# Patient Record
Sex: FEMALE | Race: WHITE | NOT HISPANIC OR LATINO
[De-identification: names, ages, dates, MRNs, and addresses within clinical notes are randomized per-mention and may not be internally consistent; named-entity substitution may affect disease eponyms.]

---

## 2017-12-10 ENCOUNTER — RESULT REVIEW (OUTPATIENT)
Age: 72
End: 2017-12-10

## 2017-12-11 ENCOUNTER — APPOINTMENT (OUTPATIENT)
Dept: DERMATOLOGY | Facility: CLINIC | Age: 72
End: 2017-12-11
Payer: COMMERCIAL

## 2017-12-11 PROCEDURE — 17003 DESTRUCT PREMALG LES 2-14: CPT

## 2017-12-11 PROCEDURE — 99203 OFFICE O/P NEW LOW 30 MIN: CPT | Mod: 25

## 2017-12-11 PROCEDURE — 17000 DESTRUCT PREMALG LESION: CPT

## 2018-01-17 ENCOUNTER — APPOINTMENT (OUTPATIENT)
Dept: MAMMOGRAPHY | Facility: CLINIC | Age: 73
End: 2018-01-17
Payer: COMMERCIAL

## 2018-01-17 ENCOUNTER — APPOINTMENT (OUTPATIENT)
Dept: RADIOLOGY | Facility: CLINIC | Age: 73
End: 2018-01-17
Payer: COMMERCIAL

## 2018-01-17 ENCOUNTER — APPOINTMENT (OUTPATIENT)
Dept: ULTRASOUND IMAGING | Facility: CLINIC | Age: 73
End: 2018-01-17
Payer: COMMERCIAL

## 2018-01-17 ENCOUNTER — OUTPATIENT (OUTPATIENT)
Dept: OUTPATIENT SERVICES | Facility: HOSPITAL | Age: 73
LOS: 1 days | End: 2018-01-17
Payer: COMMERCIAL

## 2018-01-17 DIAGNOSIS — Z98.1 ARTHRODESIS STATUS: Chronic | ICD-10-CM

## 2018-01-17 DIAGNOSIS — Z00.00 ENCOUNTER FOR GENERAL ADULT MEDICAL EXAMINATION WITHOUT ABNORMAL FINDINGS: ICD-10-CM

## 2018-01-17 PROCEDURE — 77063 BREAST TOMOSYNTHESIS BI: CPT

## 2018-01-17 PROCEDURE — 77063 BREAST TOMOSYNTHESIS BI: CPT | Mod: 26

## 2018-01-17 PROCEDURE — 76641 ULTRASOUND BREAST COMPLETE: CPT | Mod: 26,50

## 2018-01-17 PROCEDURE — 77067 SCR MAMMO BI INCL CAD: CPT | Mod: 26

## 2018-01-17 PROCEDURE — 77080 DXA BONE DENSITY AXIAL: CPT | Mod: 26

## 2018-01-17 PROCEDURE — 77080 DXA BONE DENSITY AXIAL: CPT

## 2018-01-17 PROCEDURE — 76641 ULTRASOUND BREAST COMPLETE: CPT

## 2018-01-17 PROCEDURE — 77067 SCR MAMMO BI INCL CAD: CPT

## 2018-12-20 ENCOUNTER — INPATIENT (INPATIENT)
Facility: HOSPITAL | Age: 73
LOS: 3 days | Discharge: STILL ADMITTED OR ADMINISTRATIVE | DRG: 871 | End: 2018-12-24
Attending: HOSPITALIST | Admitting: INTERNAL MEDICINE
Payer: MEDICARE

## 2018-12-20 VITALS
RESPIRATION RATE: 18 BRPM | OXYGEN SATURATION: 96 % | HEIGHT: 65 IN | HEART RATE: 113 BPM | SYSTOLIC BLOOD PRESSURE: 130 MMHG | TEMPERATURE: 99 F | DIASTOLIC BLOOD PRESSURE: 77 MMHG | WEIGHT: 138.01 LBS

## 2018-12-20 DIAGNOSIS — R11.0 NAUSEA: ICD-10-CM

## 2018-12-20 DIAGNOSIS — A09 INFECTIOUS GASTROENTERITIS AND COLITIS, UNSPECIFIED: ICD-10-CM

## 2018-12-20 DIAGNOSIS — K52.9 NONINFECTIVE GASTROENTERITIS AND COLITIS, UNSPECIFIED: ICD-10-CM

## 2018-12-20 DIAGNOSIS — E78.5 HYPERLIPIDEMIA, UNSPECIFIED: ICD-10-CM

## 2018-12-20 DIAGNOSIS — G89.28 OTHER CHRONIC POSTPROCEDURAL PAIN: ICD-10-CM

## 2018-12-20 DIAGNOSIS — I10 ESSENTIAL (PRIMARY) HYPERTENSION: ICD-10-CM

## 2018-12-20 DIAGNOSIS — Z98.1 ARTHRODESIS STATUS: Chronic | ICD-10-CM

## 2018-12-20 LAB
ALBUMIN SERPL ELPH-MCNC: 4.4 G/DL — SIGNIFICANT CHANGE UP (ref 3.3–5.2)
ALP SERPL-CCNC: 82 U/L — SIGNIFICANT CHANGE UP (ref 40–120)
ALT FLD-CCNC: 14 U/L — SIGNIFICANT CHANGE UP
ANION GAP SERPL CALC-SCNC: 16 MMOL/L — SIGNIFICANT CHANGE UP (ref 5–17)
APPEARANCE UR: CLEAR — SIGNIFICANT CHANGE UP
APTT BLD: 26.8 SEC — LOW (ref 27.5–36.3)
AST SERPL-CCNC: 19 U/L — SIGNIFICANT CHANGE UP
BASOPHILS # BLD AUTO: 0 K/UL — SIGNIFICANT CHANGE UP (ref 0–0.2)
BILIRUB SERPL-MCNC: 0.5 MG/DL — SIGNIFICANT CHANGE UP (ref 0.4–2)
BILIRUB UR-MCNC: NEGATIVE — SIGNIFICANT CHANGE UP
BLD GP AB SCN SERPL QL: SIGNIFICANT CHANGE UP
BUN SERPL-MCNC: 14 MG/DL — SIGNIFICANT CHANGE UP (ref 8–20)
CALCIUM SERPL-MCNC: 9.7 MG/DL — SIGNIFICANT CHANGE UP (ref 8.6–10.2)
CHLORIDE SERPL-SCNC: 100 MMOL/L — SIGNIFICANT CHANGE UP (ref 98–107)
CO2 SERPL-SCNC: 24 MMOL/L — SIGNIFICANT CHANGE UP (ref 22–29)
COLOR SPEC: YELLOW — SIGNIFICANT CHANGE UP
CREAT SERPL-MCNC: 0.74 MG/DL — SIGNIFICANT CHANGE UP (ref 0.5–1.3)
DIFF PNL FLD: ABNORMAL
EOSINOPHIL # BLD AUTO: 0 K/UL — SIGNIFICANT CHANGE UP (ref 0–0.5)
GLUCOSE SERPL-MCNC: 143 MG/DL — HIGH (ref 70–115)
GLUCOSE UR QL: NEGATIVE MG/DL — SIGNIFICANT CHANGE UP
HCT VFR BLD CALC: 41.6 % — SIGNIFICANT CHANGE UP (ref 37–47)
HGB BLD-MCNC: 13.8 G/DL — SIGNIFICANT CHANGE UP (ref 12–16)
INR BLD: 1 RATIO — SIGNIFICANT CHANGE UP (ref 0.88–1.16)
KETONES UR-MCNC: NEGATIVE — SIGNIFICANT CHANGE UP
LACTATE BLDV-MCNC: 1.1 MMOL/L — SIGNIFICANT CHANGE UP (ref 0.5–2)
LACTATE BLDV-MCNC: 3.2 MMOL/L — HIGH (ref 0.5–2)
LEUKOCYTE ESTERASE UR-ACNC: NEGATIVE — SIGNIFICANT CHANGE UP
LYMPHOCYTES # BLD AUTO: 0.5 K/UL — LOW (ref 1–4.8)
LYMPHOCYTES # BLD AUTO: 3 % — LOW (ref 20–55)
MCHC RBC-ENTMCNC: 30.5 PG — SIGNIFICANT CHANGE UP (ref 27–31)
MCHC RBC-ENTMCNC: 33.2 G/DL — SIGNIFICANT CHANGE UP (ref 32–36)
MCV RBC AUTO: 91.8 FL — SIGNIFICANT CHANGE UP (ref 81–99)
MONOCYTES # BLD AUTO: 1.3 K/UL — HIGH (ref 0–0.8)
MONOCYTES NFR BLD AUTO: 8 % — SIGNIFICANT CHANGE UP (ref 3–10)
NEUTROPHILS # BLD AUTO: 14.4 K/UL — HIGH (ref 1.8–8)
NEUTROPHILS NFR BLD AUTO: 86 % — HIGH (ref 37–73)
NITRITE UR-MCNC: NEGATIVE — SIGNIFICANT CHANGE UP
OB PNL STL: POSITIVE
PH UR: 6.5 — SIGNIFICANT CHANGE UP (ref 5–8)
PLATELET # BLD AUTO: 252 K/UL — SIGNIFICANT CHANGE UP (ref 150–400)
POTASSIUM SERPL-MCNC: 3.9 MMOL/L — SIGNIFICANT CHANGE UP (ref 3.5–5.3)
POTASSIUM SERPL-SCNC: 3.9 MMOL/L — SIGNIFICANT CHANGE UP (ref 3.5–5.3)
PROT SERPL-MCNC: 7.5 G/DL — SIGNIFICANT CHANGE UP (ref 6.6–8.7)
PROT UR-MCNC: 15 MG/DL
PROTHROM AB SERPL-ACNC: 11.5 SEC — SIGNIFICANT CHANGE UP (ref 10–12.9)
RBC # BLD: 4.53 M/UL — SIGNIFICANT CHANGE UP (ref 4.4–5.2)
RBC # FLD: 12.5 % — SIGNIFICANT CHANGE UP (ref 11–15.6)
SODIUM SERPL-SCNC: 140 MMOL/L — SIGNIFICANT CHANGE UP (ref 135–145)
SP GR SPEC: 1 — LOW (ref 1.01–1.02)
UROBILINOGEN FLD QL: NEGATIVE MG/DL — SIGNIFICANT CHANGE UP
WBC # BLD: 16.3 K/UL — HIGH (ref 4.8–10.8)
WBC # FLD AUTO: 16.3 K/UL — HIGH (ref 4.8–10.8)

## 2018-12-20 PROCEDURE — 99291 CRITICAL CARE FIRST HOUR: CPT

## 2018-12-20 PROCEDURE — 93010 ELECTROCARDIOGRAM REPORT: CPT

## 2018-12-20 PROCEDURE — 74174 CTA ABD&PLVS W/CONTRAST: CPT | Mod: 26

## 2018-12-20 PROCEDURE — 99223 1ST HOSP IP/OBS HIGH 75: CPT

## 2018-12-20 RX ORDER — ONDANSETRON 8 MG/1
4 TABLET, FILM COATED ORAL ONCE
Qty: 0 | Refills: 0 | Status: COMPLETED | OUTPATIENT
Start: 2018-12-20 | End: 2018-12-20

## 2018-12-20 RX ORDER — SODIUM CHLORIDE 9 MG/ML
1000 INJECTION INTRAMUSCULAR; INTRAVENOUS; SUBCUTANEOUS
Qty: 0 | Refills: 0 | Status: COMPLETED | OUTPATIENT
Start: 2018-12-20 | End: 2018-12-20

## 2018-12-20 RX ORDER — ATORVASTATIN CALCIUM 80 MG/1
10 TABLET, FILM COATED ORAL AT BEDTIME
Qty: 0 | Refills: 0 | Status: DISCONTINUED | OUTPATIENT
Start: 2018-12-20 | End: 2018-12-24

## 2018-12-20 RX ORDER — ACETAMINOPHEN 500 MG
650 TABLET ORAL EVERY 6 HOURS
Qty: 0 | Refills: 0 | Status: DISCONTINUED | OUTPATIENT
Start: 2018-12-20 | End: 2018-12-20

## 2018-12-20 RX ORDER — SODIUM CHLORIDE 9 MG/ML
1878 INJECTION INTRAMUSCULAR; INTRAVENOUS; SUBCUTANEOUS ONCE
Qty: 0 | Refills: 0 | Status: COMPLETED | OUTPATIENT
Start: 2018-12-20 | End: 2018-12-20

## 2018-12-20 RX ORDER — ERTAPENEM SODIUM 1 G/1
500 INJECTION, POWDER, LYOPHILIZED, FOR SOLUTION INTRAMUSCULAR; INTRAVENOUS ONCE
Qty: 0 | Refills: 0 | Status: COMPLETED | OUTPATIENT
Start: 2018-12-20 | End: 2018-12-20

## 2018-12-20 RX ORDER — ACETAMINOPHEN 500 MG
650 TABLET ORAL EVERY 6 HOURS
Qty: 0 | Refills: 0 | Status: DISCONTINUED | OUTPATIENT
Start: 2018-12-20 | End: 2018-12-24

## 2018-12-20 RX ORDER — CIPROFLOXACIN LACTATE 400MG/40ML
400 VIAL (ML) INTRAVENOUS EVERY 12 HOURS
Qty: 0 | Refills: 0 | Status: DISCONTINUED | OUTPATIENT
Start: 2018-12-20 | End: 2018-12-24

## 2018-12-20 RX ORDER — ATENOLOL 25 MG/1
25 TABLET ORAL DAILY
Qty: 0 | Refills: 0 | Status: DISCONTINUED | OUTPATIENT
Start: 2018-12-20 | End: 2018-12-24

## 2018-12-20 RX ORDER — ACETAMINOPHEN 500 MG
650 TABLET ORAL ONCE
Qty: 0 | Refills: 0 | Status: COMPLETED | OUTPATIENT
Start: 2018-12-20 | End: 2018-12-20

## 2018-12-20 RX ORDER — ONDANSETRON 8 MG/1
4 TABLET, FILM COATED ORAL EVERY 6 HOURS
Qty: 0 | Refills: 0 | Status: DISCONTINUED | OUTPATIENT
Start: 2018-12-20 | End: 2018-12-21

## 2018-12-20 RX ORDER — MORPHINE SULFATE 50 MG/1
4 CAPSULE, EXTENDED RELEASE ORAL ONCE
Qty: 0 | Refills: 0 | Status: DISCONTINUED | OUTPATIENT
Start: 2018-12-20 | End: 2018-12-20

## 2018-12-20 RX ORDER — METRONIDAZOLE 500 MG
500 TABLET ORAL EVERY 8 HOURS
Qty: 0 | Refills: 0 | Status: DISCONTINUED | OUTPATIENT
Start: 2018-12-20 | End: 2018-12-24

## 2018-12-20 RX ORDER — CYCLOBENZAPRINE HYDROCHLORIDE 10 MG/1
10 TABLET, FILM COATED ORAL AT BEDTIME
Qty: 0 | Refills: 0 | Status: DISCONTINUED | OUTPATIENT
Start: 2018-12-20 | End: 2018-12-24

## 2018-12-20 RX ORDER — MORPHINE SULFATE 50 MG/1
4 CAPSULE, EXTENDED RELEASE ORAL EVERY 6 HOURS
Qty: 0 | Refills: 0 | Status: DISCONTINUED | OUTPATIENT
Start: 2018-12-20 | End: 2018-12-24

## 2018-12-20 RX ORDER — MORPHINE SULFATE 50 MG/1
15 CAPSULE, EXTENDED RELEASE ORAL EVERY 4 HOURS
Qty: 0 | Refills: 0 | Status: DISCONTINUED | OUTPATIENT
Start: 2018-12-20 | End: 2018-12-24

## 2018-12-20 RX ORDER — PANTOPRAZOLE SODIUM 20 MG/1
40 TABLET, DELAYED RELEASE ORAL
Qty: 0 | Refills: 0 | Status: DISCONTINUED | OUTPATIENT
Start: 2018-12-20 | End: 2018-12-24

## 2018-12-20 RX ADMIN — SODIUM CHLORIDE 1878 MILLILITER(S): 9 INJECTION INTRAMUSCULAR; INTRAVENOUS; SUBCUTANEOUS at 12:34

## 2018-12-20 RX ADMIN — ONDANSETRON 4 MILLIGRAM(S): 8 TABLET, FILM COATED ORAL at 23:38

## 2018-12-20 RX ADMIN — Medication 200 MILLIGRAM(S): at 16:49

## 2018-12-20 RX ADMIN — MORPHINE SULFATE 15 MILLIGRAM(S): 50 CAPSULE, EXTENDED RELEASE ORAL at 17:27

## 2018-12-20 RX ADMIN — MORPHINE SULFATE 4 MILLIGRAM(S): 50 CAPSULE, EXTENDED RELEASE ORAL at 14:30

## 2018-12-20 RX ADMIN — Medication 650 MILLIGRAM(S): at 12:34

## 2018-12-20 RX ADMIN — ERTAPENEM SODIUM 100 MILLIGRAM(S): 1 INJECTION, POWDER, LYOPHILIZED, FOR SOLUTION INTRAMUSCULAR; INTRAVENOUS at 12:33

## 2018-12-20 RX ADMIN — MORPHINE SULFATE 4 MILLIGRAM(S): 50 CAPSULE, EXTENDED RELEASE ORAL at 12:33

## 2018-12-20 RX ADMIN — MORPHINE SULFATE 15 MILLIGRAM(S): 50 CAPSULE, EXTENDED RELEASE ORAL at 18:01

## 2018-12-20 RX ADMIN — ERTAPENEM SODIUM 500 MILLIGRAM(S): 1 INJECTION, POWDER, LYOPHILIZED, FOR SOLUTION INTRAMUSCULAR; INTRAVENOUS at 13:00

## 2018-12-20 RX ADMIN — Medication 1 MILLIGRAM(S): at 12:33

## 2018-12-20 RX ADMIN — ATORVASTATIN CALCIUM 10 MILLIGRAM(S): 80 TABLET, FILM COATED ORAL at 23:38

## 2018-12-20 RX ADMIN — MORPHINE SULFATE 4 MILLIGRAM(S): 50 CAPSULE, EXTENDED RELEASE ORAL at 23:39

## 2018-12-20 RX ADMIN — SODIUM CHLORIDE 1878 MILLILITER(S): 9 INJECTION INTRAMUSCULAR; INTRAVENOUS; SUBCUTANEOUS at 14:45

## 2018-12-20 RX ADMIN — Medication 100 MILLIGRAM(S): at 23:39

## 2018-12-20 RX ADMIN — SODIUM CHLORIDE 75 MILLILITER(S): 9 INJECTION INTRAMUSCULAR; INTRAVENOUS; SUBCUTANEOUS at 16:44

## 2018-12-20 RX ADMIN — Medication 650 MILLIGRAM(S): at 14:30

## 2018-12-20 RX ADMIN — ONDANSETRON 4 MILLIGRAM(S): 8 TABLET, FILM COATED ORAL at 12:35

## 2018-12-20 NOTE — H&P ADULT - HISTORY OF PRESENT ILLNESS
72 yo F w/ hx HTN, HLD, multiple spinal fusion surgery on chronic pain medications presents for acute onset of watery diarrhea with episode of gross bloody stool.    episodes started last night after eating chicken for dinner which patient states "wasn't tasting right".  denies fevers/chills/sweats at home.  associated with abdominal cramps but chronic nausea without vomiting.  no previous episodes, last colonoscopy >10 years ago. no recent EGD.  no other family members ill as didn't eat same meal.

## 2018-12-20 NOTE — ED ADULT NURSE REASSESSMENT NOTE - NS ED NURSE REASSESS COMMENT FT1
Md sim at bedside for admission evalaution
PT received from critical care RN, resting comfortably in B1L. Pt denies any pain at this time and states her "nausea has improved". PT VS WNL and pending CT of abd. Will continue to monitor patient and notifu md of any changes

## 2018-12-20 NOTE — ED ADULT TRIAGE NOTE - ADDITIONAL SAFETY/BANDS...
Patient states she woke up with left arm numbness at 0500. She states it has gotten better but her fingers are still numb.    Additional Safety/Bands:

## 2018-12-20 NOTE — ED PROVIDER NOTE - CRITICAL CARE PROVIDED
documentation/consultation with other physicians/consult w/ pt's family directly relating to pts condition/interpretation of diagnostic studies/45 MINUTES/additional history taking/direct patient care (not related to procedure)

## 2018-12-20 NOTE — ED ADULT TRIAGE NOTE - CHIEF COMPLAINT QUOTE
Pt ambulatory in ED c/o sharp abdominal pains since last night with associated diarrhea and bloody stools. Pt denies anticoagulation. Denies any known fever or chills.

## 2018-12-20 NOTE — ED STATDOCS - PROGRESS NOTE DETAILS
CODE: SEPSIS, called upon rectal temp of 101.3 and HR of 113 74 y/o F pt with hx of chronic back pain, spinal fusions, HTN, and HLD c/o abdominal pain/swelling with associated constipation, nausea, diarrhea since last night. Pt now in ED c/o bloody watery diarrhea starting today. Pt's last BM was yesterday evening; she states her nausea, and constipation are typically normal for her. Her  reports that pt has had more decreased appetite than her usual; but pt states she usually eats in "small amounts". She admits her and her  ate [employer] just last evening and believe the chicken was undercooked, pt's  did not feel any abdominal pain. Pt states her pain was very severe, and she notes tenderness to palpation. Pt has had 2 colonoscopies int eh past, with no issues; pt has one scheduled for her 10 year visit. Pt is currently on Flexeril, and morphine as needed. Pt took Morphine today at 10:00AM. Pt is allergic to Sulfa. Denies vomiting, recent travel, and hx of diverticulitis. No further complaints at this time.  PE: Abd: Tenderness to palpation in suprapubic region, and LLQ. Bowel sounds present in all 4 quadrants. : Bright red blood noted on rectal exam  MDM: Bright red bloody stool, lower abdominal pain, r/o diverticulitis; code sepsis called 73 year old c/o lower abdominal pain and bright red blood per rectum.  PE: Abd: Tenderness to palpation in suprapubic region, and LLQ. Bowel sounds present in all 4 quadrants. Bright red blood noted on rectal exam  MDM: Bright red bloody stool, lower abdominal pain, r/o diverticulitis;  patient with rectal temp code sepsis called and patient sent to Main ER for complete evaluation and workup.

## 2018-12-20 NOTE — ED STATDOCS - MEDICAL DECISION MAKING DETAILS
Pt with bright red bloody stool, lower abdominal pain, r/o diverticulitis; give meds for pain, check labs and lactate, order CT abdomen and re-evaluate. Pt with bright red bloody stool, lower abdominal pain, r/o diverticulitis; give meds for pain, check labs and lactate, order CT abdomen and re-evaluate.  **SEPSIS Critical Care pt, as per CODE: SEPSIS

## 2018-12-20 NOTE — H&P ADULT - ASSESSMENT
72 yo F w/ hx HTN, HLD, multiple spinal fusion surgery on chronic pain medications presents for acute onset of watery diarrhea with episode of gross bloody stool.

## 2018-12-20 NOTE — ED PROVIDER NOTE - MEDICAL DECISION MAKING DETAILS
PT WITH HEMATOCHEZIA AND ABD PAIN AFTER EATING [employer] NUGGETS. CHECK LABS HYDRATE TREAT AS CODE SEPSIS, FU CT

## 2018-12-20 NOTE — ED PROVIDER NOTE - CRITICAL CARE INDICATION, MLM
patient was critically ill... Patient was critically ill with a high probability of imminent or life threatening deterioration. CODE SEPSIS BRPPR AND CRAMPS FEVER

## 2018-12-20 NOTE — ED PROVIDER NOTE - OBJECTIVE STATEMENT
C/C ABDOMINAL PAIN  72 YO FEMALE WITH SUDDEN ONSET OF SEVERE AND SHARP ABDOMINAL PAIN THIS MORNING ASSOCIATED WITH BLOODY STOOL. PT AND  ATE [employer] LAST NIGHT AND STATES THEY BOTH FELT THE FOOD WAS UNDERCOOKED. NO VOMITING. + FEVER NO SIMILAR PRIOR EPISODES  NO HX COLITIS, IBD  HX CHRONIC BACK PAIN ON MEDS FOLLOWED BY PAIN MN[facility]

## 2018-12-21 DIAGNOSIS — K52.9 NONINFECTIVE GASTROENTERITIS AND COLITIS, UNSPECIFIED: ICD-10-CM

## 2018-12-21 LAB
ANION GAP SERPL CALC-SCNC: 11 MMOL/L — SIGNIFICANT CHANGE UP (ref 5–17)
BASOPHILS # BLD AUTO: 0 K/UL — SIGNIFICANT CHANGE UP (ref 0–0.2)
BASOPHILS NFR BLD AUTO: 0.3 % — SIGNIFICANT CHANGE UP (ref 0–2)
BUN SERPL-MCNC: 7 MG/DL — LOW (ref 8–20)
CALCIUM SERPL-MCNC: 7.9 MG/DL — LOW (ref 8.6–10.2)
CHLORIDE SERPL-SCNC: 106 MMOL/L — SIGNIFICANT CHANGE UP (ref 98–107)
CO2 SERPL-SCNC: 24 MMOL/L — SIGNIFICANT CHANGE UP (ref 22–29)
CREAT SERPL-MCNC: 0.56 MG/DL — SIGNIFICANT CHANGE UP (ref 0.5–1.3)
CULTURE RESULTS: NO GROWTH — SIGNIFICANT CHANGE UP
EOSINOPHIL # BLD AUTO: 0.1 K/UL — SIGNIFICANT CHANGE UP (ref 0–0.5)
EOSINOPHIL NFR BLD AUTO: 0.9 % — SIGNIFICANT CHANGE UP (ref 0–6)
GLUCOSE SERPL-MCNC: 101 MG/DL — SIGNIFICANT CHANGE UP (ref 70–115)
HCT VFR BLD CALC: 31.7 % — LOW (ref 37–47)
HGB BLD-MCNC: 10.3 G/DL — LOW (ref 12–16)
LYMPHOCYTES # BLD AUTO: 1 K/UL — SIGNIFICANT CHANGE UP (ref 1–4.8)
LYMPHOCYTES # BLD AUTO: 10.1 % — LOW (ref 20–55)
MAGNESIUM SERPL-MCNC: 1.7 MG/DL — SIGNIFICANT CHANGE UP (ref 1.6–2.6)
MCHC RBC-ENTMCNC: 30.2 PG — SIGNIFICANT CHANGE UP (ref 27–31)
MCHC RBC-ENTMCNC: 32.5 G/DL — SIGNIFICANT CHANGE UP (ref 32–36)
MCV RBC AUTO: 93 FL — SIGNIFICANT CHANGE UP (ref 81–99)
MONOCYTES # BLD AUTO: 1 K/UL — HIGH (ref 0–0.8)
MONOCYTES NFR BLD AUTO: 9.2 % — SIGNIFICANT CHANGE UP (ref 3–10)
NEUTROPHILS # BLD AUTO: 8.3 K/UL — HIGH (ref 1.8–8)
NEUTROPHILS NFR BLD AUTO: 79.3 % — HIGH (ref 37–73)
PLATELET # BLD AUTO: 174 K/UL — SIGNIFICANT CHANGE UP (ref 150–400)
POTASSIUM SERPL-MCNC: 3.2 MMOL/L — LOW (ref 3.5–5.3)
POTASSIUM SERPL-SCNC: 3.2 MMOL/L — LOW (ref 3.5–5.3)
RBC # BLD: 3.41 M/UL — LOW (ref 4.4–5.2)
RBC # FLD: 12.7 % — SIGNIFICANT CHANGE UP (ref 11–15.6)
SODIUM SERPL-SCNC: 141 MMOL/L — SIGNIFICANT CHANGE UP (ref 135–145)
SPECIMEN SOURCE: SIGNIFICANT CHANGE UP
WBC # BLD: 10.4 K/UL — SIGNIFICANT CHANGE UP (ref 4.8–10.8)
WBC # FLD AUTO: 10.4 K/UL — SIGNIFICANT CHANGE UP (ref 4.8–10.8)

## 2018-12-21 PROCEDURE — 99232 SBSQ HOSP IP/OBS MODERATE 35: CPT | Mod: GC

## 2018-12-21 RX ORDER — DEXTROSE MONOHYDRATE, SODIUM CHLORIDE, AND POTASSIUM CHLORIDE 50; .745; 4.5 G/1000ML; G/1000ML; G/1000ML
1000 INJECTION, SOLUTION INTRAVENOUS
Qty: 0 | Refills: 0 | Status: DISCONTINUED | OUTPATIENT
Start: 2018-12-21 | End: 2018-12-22

## 2018-12-21 RX ORDER — SODIUM CHLORIDE 9 MG/ML
1000 INJECTION INTRAMUSCULAR; INTRAVENOUS; SUBCUTANEOUS
Qty: 0 | Refills: 0 | Status: COMPLETED | OUTPATIENT
Start: 2018-12-21 | End: 2018-12-21

## 2018-12-21 RX ORDER — SACCHAROMYCES BOULARDII 250 MG
250 POWDER IN PACKET (EA) ORAL
Qty: 0 | Refills: 0 | Status: DISCONTINUED | OUTPATIENT
Start: 2018-12-21 | End: 2018-12-24

## 2018-12-21 RX ORDER — DEXTROSE MONOHYDRATE, SODIUM CHLORIDE, AND POTASSIUM CHLORIDE 50; .745; 4.5 G/1000ML; G/1000ML; G/1000ML
1000 INJECTION, SOLUTION INTRAVENOUS
Qty: 0 | Refills: 0 | Status: DISCONTINUED | OUTPATIENT
Start: 2018-12-21 | End: 2018-12-21

## 2018-12-21 RX ORDER — POTASSIUM CHLORIDE 20 MEQ
40 PACKET (EA) ORAL ONCE
Qty: 0 | Refills: 0 | Status: COMPLETED | OUTPATIENT
Start: 2018-12-21 | End: 2018-12-21

## 2018-12-21 RX ADMIN — Medication 200 MILLIGRAM(S): at 20:28

## 2018-12-21 RX ADMIN — Medication 200 MILLIGRAM(S): at 06:20

## 2018-12-21 RX ADMIN — Medication 40 MILLIEQUIVALENT(S): at 17:44

## 2018-12-21 RX ADMIN — SODIUM CHLORIDE 75 MILLILITER(S): 9 INJECTION INTRAMUSCULAR; INTRAVENOUS; SUBCUTANEOUS at 06:19

## 2018-12-21 RX ADMIN — Medication 650 MILLIGRAM(S): at 18:08

## 2018-12-21 RX ADMIN — Medication 650 MILLIGRAM(S): at 10:00

## 2018-12-21 RX ADMIN — PANTOPRAZOLE SODIUM 40 MILLIGRAM(S): 20 TABLET, DELAYED RELEASE ORAL at 06:20

## 2018-12-21 RX ADMIN — Medication 650 MILLIGRAM(S): at 09:46

## 2018-12-21 RX ADMIN — Medication 100 MILLIGRAM(S): at 06:19

## 2018-12-21 RX ADMIN — Medication 250 MILLIGRAM(S): at 18:07

## 2018-12-21 RX ADMIN — Medication 200 MILLIGRAM(S): at 09:46

## 2018-12-21 RX ADMIN — MORPHINE SULFATE 4 MILLIGRAM(S): 50 CAPSULE, EXTENDED RELEASE ORAL at 00:05

## 2018-12-21 RX ADMIN — Medication 100 MILLIGRAM(S): at 22:19

## 2018-12-21 RX ADMIN — DEXTROSE MONOHYDRATE, SODIUM CHLORIDE, AND POTASSIUM CHLORIDE 125 MILLILITER(S): 50; .745; 4.5 INJECTION, SOLUTION INTRAVENOUS at 12:20

## 2018-12-21 RX ADMIN — ATORVASTATIN CALCIUM 10 MILLIGRAM(S): 80 TABLET, FILM COATED ORAL at 22:19

## 2018-12-21 RX ADMIN — Medication 100 MILLIGRAM(S): at 17:55

## 2018-12-21 RX ADMIN — Medication 650 MILLIGRAM(S): at 23:59

## 2018-12-21 RX ADMIN — CYCLOBENZAPRINE HYDROCHLORIDE 10 MILLIGRAM(S): 10 TABLET, FILM COATED ORAL at 22:19

## 2018-12-21 RX ADMIN — Medication 650 MILLIGRAM(S): at 17:44

## 2018-12-21 NOTE — CONSULT NOTE ADULT - SUBJECTIVE AND OBJECTIVE BOX
HISTORY OF PRESENT ILLNESS:  This is a 73y old Female with a past medical history significant for HTN, hyperlipidemia presents after having several episodes of diarrhea and abdominal pain. Two night s ag she ate some chicken that tasted funny she began having diarrhea and abdominal pain. Initially it was liquid brown stool every 1/2 hour and then it became bloody. It was associated with left sided abdominal pain. She presented to the ER had a leukocytosis and Ct showed colitis involving the transverse and descending colon. She continues to have abdominal pain and diarrhea although the diarrhea is less frequent. She has had 2 colonoscopies in the past the last was 10 years ago and was unremarkable. No travel history or sick contacts.       REVIEW OF SYSTEMS:  Constitutional:  No unintentional weight loss, fevers, chills or night sweats	  Eyes: No eye pain, redness, discharge, or proptosis  ENMT: No sore throat, ear pain, mouth sores, or swollen glands in the neck  Respiratory: No dyspnea, cough or wheezing  Cardiovascular: No chest pain, dyspnea on exertion, or orthopnea  Gastrointestinal:	Please see HPI  Genitourinary: No dysuria or hematuria  Neurological:	 No changes in sleep/wake cycle, convulsions, confusion, dizziness or lightheadedness  Psychiatric: No changes in personality or emotional problems   Hematology: No easy bruising   Endocrine: No hot or cold flashes or deepening of voice	  All other review of systems were completed and were otherwise negative save what is reported in the HPI.    PAST MEDICAL/SURGICAL HISTORY:  Back pain  Hyperlipidemia  Hypertension  H/O spinal fusion    SOCIAL HISTORY:  - TOBACCO: None  - ALCOHOL: occasional  - ILLICIT DRUG USE: Denies    FAMILY HISTORY:  No known history of gastrointestinal or liver disease;  No pertinent family history in first degree relatives      HOME MEDICATIONS:  aspirin 81 mg oral tablet: 1 tab(s) orally once a day (23 Dec 2016 13:16)  atenolol 25 mg oral tablet: 1 tab(s) orally once a day (23 Dec 2016 13:16)  Lipitor 10 mg oral tablet: 1 tab(s) orally once a day (23 Dec 2016 13:16)  morphine 15 mg oral capsule: 15 milligram(s) orally every 4 hours (23 Dec 2016 13:16)    INPATIENT MEDICATIONS:  MEDICATIONS  (STANDING):  ATENolol  Tablet 25 milliGRAM(s) Oral daily  atorvastatin 10 milliGRAM(s) Oral at bedtime  ciprofloxacin   IVPB 400 milliGRAM(s) IV Intermittent every 12 hours  metroNIDAZOLE  IVPB 500 milliGRAM(s) IV Intermittent every 8 hours  pantoprazole    Tablet 40 milliGRAM(s) Oral before breakfast  potassium chloride   Powder 40 milliEquivalent(s) Oral once  saccharomyces boulardii 250 milliGRAM(s) Oral two times a day  sodium chloride 0.9% with potassium chloride 20 mEq/L 1000 milliLiter(s) (125 mL/Hr) IV Continuous <Continuous>    MEDICATIONS  (PRN):  acetaminophen   Tablet .. 650 milliGRAM(s) Oral every 6 hours PRN Temp greater or equal to 38C (100.4F), Mild Pain (1 - 3)  cyclobenzaprine 10 milliGRAM(s) Oral at bedtime PRN Muscle Spasm  morphine  - Injectable 4 milliGRAM(s) IV Push every 6 hours PRN breakthrough pain  morphine  IR 15 milliGRAM(s) Oral every 4 hours PRN mod to severe pain  trimethobenzamide Injectable 200 milliGRAM(s) IntraMuscular three times a day PRN Nausea and/or Vomiting    ALLERGIES:  sulfa drugs (Unknown)    VITAL SIGNS LAST 24 HOURS:  T(C): 37.6 (21 Dec 2018 08:11), Max: 37.8 (20 Dec 2018 20:55)  T(F): 99.6 (21 Dec 2018 08:11), Max: 100 (20 Dec 2018 20:55)  HR: 82 (21 Dec 2018 08:11) (65 - 101)  BP: 90/51 (21 Dec 2018 08:11) (90/51 - 126/87)  BP(mean): --  RR: 18 (21 Dec 2018 08:11) (14 - 18)  SpO2: 94% (21 Dec 2018 08:11) (94% - 100%)        PHYSICAL EXAM:  Constitutional: Well-developed, well-nourished, in no apparent distress  Eyes: Sclerae anicteric, conjunctivae normal  ENMT: Mucus membranes moist, no oropharyngeal thrush noted  Neck: No thyroid nodules appreciated, no significant cervical or supraclavicular lymphadenopathy  Respiratory: Breathing nonlabored; clear to auscultation  Cardiovascular: Regular rate and rhythm  Gastrointestinal: Soft, nontender, nondistended, normoactive bowel sounds; no hepatosplenomegaly appreciated; no rebound tenderness or involuntary guarding  Rectal: Perianal exam within normal limits; normal sphincter tone, scant blood and brown stool on glove  Extremities: No clubbing, cyanosis or edema  Neurological: Alert and oriented to person, place and time; no asterixis  Skin: No jaundice  Lymph Nodes: No significant lymphadenopathy  Musculoskeletal: No significant peripheral atrophy  Psychiatric: Affect and mood appropriate      LABS:                        10.3   10.4  )-----------( 174      ( 21 Dec 2018 07:51 )             31.7     PT/INR - ( 20 Dec 2018 11:54 )   PT: 11.5 sec;   INR: 1.00 ratio         PTT - ( 20 Dec 2018 11:54 )  PTT:26.8 sec      141  |  106  |  7.0<L>  ----------------------------<  101  3.2<L>   |  24.0  |  0.56    Ca    7.9<L>      21 Dec 2018 07:51  Mg     1.7         TPro  7.5  /  Alb  4.4  /  TBili  0.5  /  DBili  x   /  AST  19  /  ALT  14  /  AlkPhos  82  12-20    LIVER FUNCTIONS - ( 20 Dec 2018 11:54 )  Alb: 4.4 g/dL / Pro: 7.5 g/dL / ALK PHOS: 82 U/L / ALT: 14 U/L / AST: 19 U/L / GGT: x           Urinalysis Basic - ( 20 Dec 2018 14:14 )    Color: Yellow / Appearance: Clear / S.005 / pH: x  Gluc: x / Ketone: Negative  / Bili: Negative / Urobili: Negative mg/dL   Blood: x / Protein: 15 mg/dL / Nitrite: Negative   Leuk Esterase: Negative / RBC: 3-5 /HPF / WBC 0-2   Sq Epi: x / Non Sq Epi: Occasional / Bacteria: x        Culture - Urine (collected 20 Dec 2018 14:13)  Source: .Urine  Final Report (21 Dec 2018 09:30):    No growth        IMAGING:  I personally reviewed the CT , and I agree with the radiologist's interpretation.    < from: CT Angio Abdomen and Pelvis w/ IV Cont (18 @ 13:41) >  IMPRESSION:    Segmental colitis from the distal transverse to the sigmoid without   active gastrointestinal hemorrhage, pneumatosis, or free air.     < end of copied text >

## 2018-12-21 NOTE — PROGRESS NOTE ADULT - SUBJECTIVE AND OBJECTIVE BOX
RUTH CAMPOS    [id number]    73y      Female    CC: GROSS BLOODY STOOLS    INTERVAL HPI/OVERNIGHT EVENTS:  Was seen and examined at bedside. Having 4 episodes of bloody diarrhea since last night. was not able to tolerate ginger ale    HPI:  72 yo F w/ hx HTN, HLD, multiple spinal fusion surgery on chronic pain medications presents for acute onset of watery diarrhea with episode of gross bloody stool.    episodes started last night after eating chicken for dinner which patient states "wasn't tasting right".  denies fevers/chills/sweats at home.  associated with abdominal cramps but chronic nausea without vomiting.  no previous episodes, last colonoscopy >10 years ago. no recent EGD.  no other family members ill as didn't eat same meal.      REVIEW OF SYSTEMS:    CONSTITUTIONAL: No fever, weight loss, or fatigue  RESPIRATORY: No cough, wheezing, hemoptysis; No shortness of breath  CARDIOVASCULAR: No chest pain, palpitations  GASTROINTESTINAL: No abdominal or epigastric pain. No nausea, vomiting  NEUROLOGICAL: No headaches, memory loss, loss of strength.  MISCELLANEOUS:      Vital Signs Last 24 Hrs  T(C): 37.6 (21 Dec 2018 08:11), Max: 37.8 (20 Dec 2018 20:55)  T(F): 99.6 (21 Dec 2018 08:11), Max: 100 (20 Dec 2018 20:55)  HR: 82 (21 Dec 2018 08:11) (65 - 101)  BP: 90/51 (21 Dec 2018 08:11) (90/51 - 126/87)  BP(mean): --  RR: 18 (21 Dec 2018 08:11) (14 - 18)  SpO2: 94% (21 Dec 2018 08:11) (94% - 100%)    PHYSICAL EXAM:    GENERAL: NAD, well-groomed  HEENT: PERRL, +EOMI  NECK: soft, Supple, No JVD,   CHEST/LUNG: Clear to auscultation bilaterally; No wheezing  HEART: S1S2+, Regular rate and rhythm; No murmurs, rubs, or gallops  ABDOMEN: Soft, Nontender, Nondistended; Bowel sounds present  EXTREMITIES:  2+ Peripheral Pulses, No clubbing, cyanosis, or edema  SKIN: No rashes or lesions  NEURO: AAOX3, no focal deficits, no motor r sensory loss  PSYCH: normal mood      LABS:                        10.3   10.4  )-----------( 174      ( 21 Dec 2018 07:51 )             31.7     12-    141  |  106  |  7.0<L>  ----------------------------<  101  3.2<L>   |  24.0  |  0.56    Ca    7.9<L>      21 Dec 2018 07:51  Mg     1.7     -    TPro  7.5  /  Alb  4.4  /  TBili  0.5  /  DBili  x   /  AST  19  /  ALT  14  /  AlkPhos  82  12-    PT/INR - ( 20 Dec 2018 11:54 )   PT: 11.5 sec;   INR: 1.00 ratio         PTT - ( 20 Dec 2018 11:54 )  PTT:26.8 sec  Urinalysis Basic - ( 20 Dec 2018 14:14 )    Color: Yellow / Appearance: Clear / S.005 / pH: x  Gluc: x / Ketone: Negative  / Bili: Negative / Urobili: Negative mg/dL   Blood: x / Protein: 15 mg/dL / Nitrite: Negative   Leuk Esterase: Negative / RBC: 3-5 /HPF / WBC 0-2   Sq Epi: x / Non Sq Epi: Occasional / Bacteria: x          MEDICATIONS  (STANDING):  ATENolol  Tablet 25 milliGRAM(s) Oral daily  atorvastatin 10 milliGRAM(s) Oral at bedtime  ciprofloxacin   IVPB 400 milliGRAM(s) IV Intermittent every 12 hours  metroNIDAZOLE  IVPB 500 milliGRAM(s) IV Intermittent every 8 hours  pantoprazole    Tablet 40 milliGRAM(s) Oral before breakfast  potassium chloride   Powder 40 milliEquivalent(s) Oral once  saccharomyces boulardii 250 milliGRAM(s) Oral two times a day  sodium chloride 0.9% with potassium chloride 20 mEq/L 1000 milliLiter(s) (125 mL/Hr) IV Continuous <Continuous>    MEDICATIONS  (PRN):  acetaminophen   Tablet .. 650 milliGRAM(s) Oral every 6 hours PRN Temp greater or equal to 38C (100.4F), Mild Pain (1 - 3)  cyclobenzaprine 10 milliGRAM(s) Oral at bedtime PRN Muscle Spasm  morphine  - Injectable 4 milliGRAM(s) IV Push every 6 hours PRN breakthrough pain  morphine  IR 15 milliGRAM(s) Oral every 4 hours PRN mod to severe pain  trimethobenzamide Injectable 200 milliGRAM(s) IntraMuscular three times a day PRN Nausea and/or Vomiting      RADIOLOGY & ADDITIONAL TESTS:

## 2018-12-21 NOTE — CONSULT NOTE ADULT - ASSESSMENT
73y old Female with a past medical history significant for HTN, hyperlipidemia presents after having several episodes of diarrhea and abdominal pain. CT c/w colitis involving the distal transverse colon and descending colon. WBC 16 on presentation.   Likely infectious colitis there may be a component of ischemic colitis at this point given the watershed distribution of the colitis. She seems to be improving.     - agree with abx   - cont IVF  - clear liquid diet   - colonoscopy as an outpt in 4-6 weeks         Thanks

## 2018-12-21 NOTE — PROGRESS NOTE ADULT - ASSESSMENT
Assessment and Plan:    72 yo F w/ hx HTN, HLD, multiple spinal fusion surgery on chronic pain medications presents for acute onset of watery diarrhea with episode of gross bloody stool.  GI consulted     Problem/Plan - 1:  ·  Problem: Infectious colitis.  Plan: sepsis criteria met in ER  f/u cultures, GI PCR  IVF,  IV abx  advance diet as tolerated.      Problem/Plan - 2:  ·  Problem: Chronic pain following surgery or procedure.  Plan: restart home pain meds.      Problem/Plan - 3:  ·  Problem: Essential hypertension.  Plan: atenolol  hold aspirin.      Problem/Plan - 4:  ·  Problem: Hyperlipidemia, unspecified hyperlipidemia type.  Plan: lipitor.      Problem/Plan - 5:  ·  Problem: Chronic nausea.  Plan: zofran prn  advised outpatient GI f/u for possible EGD.

## 2018-12-22 PROCEDURE — 99232 SBSQ HOSP IP/OBS MODERATE 35: CPT

## 2018-12-22 RX ORDER — POTASSIUM CHLORIDE 20 MEQ
40 PACKET (EA) ORAL ONCE
Qty: 0 | Refills: 0 | Status: COMPLETED | OUTPATIENT
Start: 2018-12-22 | End: 2018-12-22

## 2018-12-22 RX ORDER — POTASSIUM CHLORIDE 20 MEQ
40 PACKET (EA) ORAL EVERY 4 HOURS
Qty: 0 | Refills: 0 | Status: COMPLETED | OUTPATIENT
Start: 2018-12-22 | End: 2018-12-22

## 2018-12-22 RX ADMIN — Medication 650 MILLIGRAM(S): at 00:54

## 2018-12-22 RX ADMIN — Medication 100 MILLIGRAM(S): at 05:36

## 2018-12-22 RX ADMIN — MORPHINE SULFATE 15 MILLIGRAM(S): 50 CAPSULE, EXTENDED RELEASE ORAL at 20:39

## 2018-12-22 RX ADMIN — Medication 650 MILLIGRAM(S): at 10:44

## 2018-12-22 RX ADMIN — PANTOPRAZOLE SODIUM 40 MILLIGRAM(S): 20 TABLET, DELAYED RELEASE ORAL at 05:36

## 2018-12-22 RX ADMIN — ATORVASTATIN CALCIUM 10 MILLIGRAM(S): 80 TABLET, FILM COATED ORAL at 21:56

## 2018-12-22 RX ADMIN — Medication 200 MILLIGRAM(S): at 20:44

## 2018-12-22 RX ADMIN — Medication 250 MILLIGRAM(S): at 05:36

## 2018-12-22 RX ADMIN — CYCLOBENZAPRINE HYDROCHLORIDE 10 MILLIGRAM(S): 10 TABLET, FILM COATED ORAL at 22:02

## 2018-12-22 RX ADMIN — MORPHINE SULFATE 15 MILLIGRAM(S): 50 CAPSULE, EXTENDED RELEASE ORAL at 21:57

## 2018-12-22 RX ADMIN — Medication 100 MILLIGRAM(S): at 14:45

## 2018-12-22 RX ADMIN — Medication 200 MILLIGRAM(S): at 08:49

## 2018-12-22 RX ADMIN — Medication 650 MILLIGRAM(S): at 11:44

## 2018-12-22 RX ADMIN — Medication 200 MILLIGRAM(S): at 20:38

## 2018-12-22 RX ADMIN — Medication 100 MILLIGRAM(S): at 21:56

## 2018-12-22 RX ADMIN — Medication 40 MILLIEQUIVALENT(S): at 14:07

## 2018-12-22 RX ADMIN — Medication 250 MILLIGRAM(S): at 18:09

## 2018-12-22 RX ADMIN — Medication 40 MILLIEQUIVALENT(S): at 17:19

## 2018-12-22 NOTE — PROGRESS NOTE ADULT - SUBJECTIVE AND OBJECTIVE BOX
RUTH CAMPOS  ----------------------------------------  The patient was seen and evaluated for colitis.  The patient is in no acute distress.  Denied any chest pain, palpitations, or dyspnea.  Reported improvement in the abdominal pain and minimal bleeding with bowel movement.    Vital Signs Last 24 Hrs  T(C): 37.2 (22 Dec 2018 00:22), Max: 37.2 (22 Dec 2018 00:22)  T(F): 98.9 (22 Dec 2018 00:22), Max: 98.9 (22 Dec 2018 00:22)  HR: 69 (22 Dec 2018 04:22) (69 - 82)  BP: 99/60 (22 Dec 2018 04:22) (99/60 - 114/70)  BP(mean): --  RR: 18 (22 Dec 2018 00:22) (18 - 18)  SpO2: 96% (21 Dec 2018 15:49) (96% - 96%)    PHYSICAL EXAMINATION:  ----------------------------------------  General appearance: No acute distress, Awake, Alert  HEENT: Normocephalic, Atraumatic, Conjunctiva clear, EOMI  Neck: Supple, No JVD, No tenderness  Lungs: Clear to auscultation, Breath sound equal bilaterally, No wheezes, No rales  Cardiovascular: S1S2, Regular rhythm  Abdomen: Soft, Nontender, Nondistended, No guarding/rebound, Positive bowel sounds  Extremities: No clubbing, No cyanosis, No edema, No calf tenderness  Neuro: Strength equal bilaterally, No tremors  Psychiatric: Appropriate mood, Normal affect    LABORATORY STUDIES:  ----------------------------------------             10.3   10.4  )-----------( 174      ( 21 Dec 2018 07:51 )             31.7     12-21    141  |  106  |  7.0<L>  ----------------------------<  101  3.2<L>   |  24.0  |  0.56    Ca    7.9<L>      21 Dec 2018 07:51  Mg     1.7     -    TPro  7.5  /  Alb  4.4  /  TBili  0.5  /  DBili  x   /  AST  19  /  ALT  14  /  AlkPhos  82  12    LIVER FUNCTIONS - ( 20 Dec 2018 11:54 )  Alb: 4.4 g/dL / Pro: 7.5 g/dL / ALK PHOS: 82 U/L / ALT: 14 U/L / AST: 19 U/L / GGT: x           PT/INR - ( 20 Dec 2018 11:54 )   PT: 11.5 sec;   INR: 1.00 ratio    PTT - ( 20 Dec 2018 11:54 )  PTT:26.8 sec    Urinalysis Basic - ( 20 Dec 2018 14:14 )  Color: Yellow / Appearance: Clear / S.005 / pH: x  Gluc: x / Ketone: Negative  / Bili: Negative / Urobili: Negative mg/dL   Blood: x / Protein: 15 mg/dL / Nitrite: Negative   Leuk Esterase: Negative / RBC: 3-5 /HPF / WBC 0-2   Sq Epi: x / Non Sq Epi: Occasional / Bacteria: x    Culture - Urine (collected 20 Dec 2018 14:13)  Source: .Urine  Final Report (21 Dec 2018 09:30):    No growth    MEDICATIONS  (STANDING):  ATENolol  Tablet 25 milliGRAM(s) Oral daily  atorvastatin 10 milliGRAM(s) Oral at bedtime  ciprofloxacin   IVPB 400 milliGRAM(s) IV Intermittent every 12 hours  metroNIDAZOLE  IVPB 500 milliGRAM(s) IV Intermittent every 8 hours  pantoprazole    Tablet 40 milliGRAM(s) Oral before breakfast  potassium chloride    Tablet ER 40 milliEquivalent(s) Oral every 4 hours  saccharomyces boulardii 250 milliGRAM(s) Oral two times a day    MEDICATIONS  (PRN):  acetaminophen   Tablet .. 650 milliGRAM(s) Oral every 6 hours PRN Temp greater or equal to 38C (100.4F), Mild Pain (1 - 3)  cyclobenzaprine 10 milliGRAM(s) Oral at bedtime PRN Muscle Spasm  morphine  - Injectable 4 milliGRAM(s) IV Push every 6 hours PRN breakthrough pain  morphine  IR 15 milliGRAM(s) Oral every 4 hours PRN mod to severe pain  trimethobenzamide Injectable 200 milliGRAM(s) IntraMuscular three times a day PRN Nausea and/or Vomiting      ASSESSMENT / PLAN:  ----------------------------------------  Colitis / Hematochezia - On intravenous antibiotics. Leukocytosis and abdominal pain improved. Bleeding subsiding. Stool study results to be followed. To advance diet to full liquids. Gastroenterology consultation noted.    Anemia - Most likely due to acute blood loss. CBC to be repeated.    Back pain - Analgesic medications as needed.    Hypertension - Close blood pressure monitoring. On atenolol.    Hypokalemia - Potassium supplementation.

## 2018-12-22 NOTE — PROGRESS NOTE ADULT - SUBJECTIVE AND OBJECTIVE BOX
Patient seen and examined.  Chart reviewed.  Notes moderate nausea.  No vomiting.  No fever.  No distention.   Having multiple small diarrheal BM's today, about 6-8 x.  Bleeding has resolved.  Not much appetite.      PAST MEDICAL & SURGICAL HISTORY:  Back pain  Hyperlipidemia  Hypertension  H/O spinal fusion      ROS:  No Heartburn, regurgitation, dysphagia, odynophagia.  No dyspepsia  No abdominal pain.    No Nausea, vomiting.  No Bleeding.  No hematemesis.   No diarrhea.    No hematochesia.  No weight loss, anorexia.  No edema.      MEDICATIONS  (STANDING):  ATENolol  Tablet 25 milliGRAM(s) Oral daily  atorvastatin 10 milliGRAM(s) Oral at bedtime  ciprofloxacin   IVPB 400 milliGRAM(s) IV Intermittent every 12 hours  metroNIDAZOLE  IVPB 500 milliGRAM(s) IV Intermittent every 8 hours  pantoprazole    Tablet 40 milliGRAM(s) Oral before breakfast  saccharomyces boulardii 250 milliGRAM(s) Oral two times a day    MEDICATIONS  (PRN):  acetaminophen   Tablet .. 650 milliGRAM(s) Oral every 6 hours PRN Temp greater or equal to 38C (100.4F), Mild Pain (1 - 3)  cyclobenzaprine 10 milliGRAM(s) Oral at bedtime PRN Muscle Spasm  morphine  - Injectable 4 milliGRAM(s) IV Push every 6 hours PRN breakthrough pain  morphine  IR 15 milliGRAM(s) Oral every 4 hours PRN mod to severe pain  trimethobenzamide Injectable 200 milliGRAM(s) IntraMuscular three times a day PRN Nausea and/or Vomiting      Allergies    sulfa drugs (Unknown)    Intolerances        Vital Signs Last 24 Hrs  T(C): 36.9 (22 Dec 2018 15:50), Max: 37.2 (22 Dec 2018 00:22)  T(F): 98.5 (22 Dec 2018 15:50), Max: 98.9 (22 Dec 2018 00:22)  HR: 92 (22 Dec 2018 15:50) (69 - 92)  BP: 124/68 (22 Dec 2018 15:50) (99/60 - 124/68)  BP(mean): --  RR: 18 (22 Dec 2018 00:22) (18 - 18)  SpO2: --    PHYSICAL EXAM:    GENERAL: NAD, well-groomed, well-developed  HEAD:  Atraumatic, Normocephalic  EYES: EOMI, PERRLA, conjunctiva and sclera clear  ENMT: No tonsillar erythema, exudates, or enlargement; Moist mucous membranes, Good dentition, No lesions  NECK: Supple, No JVD, Normal thyroid  CHEST/LUNG: Clear to percussion bilaterally; No rales, rhonchi, wheezing, or rubs  HEART: Regular rate and rhythm; No murmurs, rubs, or gallops  ABDOMEN: Soft, moderate tenderness in left abdomen. , Nondistended; Bowel sounds present;  No scars.   EXTREMITIES:  2+ Peripheral Pulses, No clubbing, cyanosis, or edema  LYMPH: No lymphadenopathy noted  SKIN: No rashes or lesions      LABS:                        10.3   10.4  )-----------( 174      ( 21 Dec 2018 07:51 )             31.7     12-21    141  |  106  |  7.0<L>  ----------------------------<  101  3.2<L>   |  24.0  |  0.56    Ca    7.9<L>      21 Dec 2018 07:51  Mg     1.7     12-21               RADIOLOGY & ADDITIONAL STUDIES:

## 2018-12-23 LAB
ANION GAP SERPL CALC-SCNC: 12 MMOL/L — SIGNIFICANT CHANGE UP (ref 5–17)
BUN SERPL-MCNC: <3 MG/DL — LOW (ref 8–20)
CALCIUM SERPL-MCNC: 8.5 MG/DL — LOW (ref 8.6–10.2)
CHLORIDE SERPL-SCNC: 109 MMOL/L — HIGH (ref 98–107)
CO2 SERPL-SCNC: 24 MMOL/L — SIGNIFICANT CHANGE UP (ref 22–29)
CREAT SERPL-MCNC: 0.51 MG/DL — SIGNIFICANT CHANGE UP (ref 0.5–1.3)
CULTURE RESULTS: SIGNIFICANT CHANGE UP
GLUCOSE SERPL-MCNC: 87 MG/DL — SIGNIFICANT CHANGE UP (ref 70–115)
HCT VFR BLD CALC: 36.5 % — LOW (ref 37–47)
HGB BLD-MCNC: 12 G/DL — SIGNIFICANT CHANGE UP (ref 12–16)
MCHC RBC-ENTMCNC: 30.2 PG — SIGNIFICANT CHANGE UP (ref 27–31)
MCHC RBC-ENTMCNC: 32.9 G/DL — SIGNIFICANT CHANGE UP (ref 32–36)
MCV RBC AUTO: 91.9 FL — SIGNIFICANT CHANGE UP (ref 81–99)
PLATELET # BLD AUTO: 188 K/UL — SIGNIFICANT CHANGE UP (ref 150–400)
POTASSIUM SERPL-MCNC: 4.2 MMOL/L — SIGNIFICANT CHANGE UP (ref 3.5–5.3)
POTASSIUM SERPL-SCNC: 4.2 MMOL/L — SIGNIFICANT CHANGE UP (ref 3.5–5.3)
RBC # BLD: 3.97 M/UL — LOW (ref 4.4–5.2)
RBC # FLD: 12.2 % — SIGNIFICANT CHANGE UP (ref 11–15.6)
SODIUM SERPL-SCNC: 145 MMOL/L — SIGNIFICANT CHANGE UP (ref 135–145)
SPECIMEN SOURCE: SIGNIFICANT CHANGE UP
WBC # BLD: 9.6 K/UL — SIGNIFICANT CHANGE UP (ref 4.8–10.8)
WBC # FLD AUTO: 9.6 K/UL — SIGNIFICANT CHANGE UP (ref 4.8–10.8)

## 2018-12-23 PROCEDURE — 99232 SBSQ HOSP IP/OBS MODERATE 35: CPT

## 2018-12-23 RX ADMIN — Medication 100 MILLIGRAM(S): at 13:19

## 2018-12-23 RX ADMIN — ATORVASTATIN CALCIUM 10 MILLIGRAM(S): 80 TABLET, FILM COATED ORAL at 21:30

## 2018-12-23 RX ADMIN — MORPHINE SULFATE 15 MILLIGRAM(S): 50 CAPSULE, EXTENDED RELEASE ORAL at 03:50

## 2018-12-23 RX ADMIN — Medication 650 MILLIGRAM(S): at 21:30

## 2018-12-23 RX ADMIN — ATENOLOL 25 MILLIGRAM(S): 25 TABLET ORAL at 06:06

## 2018-12-23 RX ADMIN — CYCLOBENZAPRINE HYDROCHLORIDE 10 MILLIGRAM(S): 10 TABLET, FILM COATED ORAL at 21:30

## 2018-12-23 RX ADMIN — Medication 250 MILLIGRAM(S): at 06:06

## 2018-12-23 RX ADMIN — Medication 650 MILLIGRAM(S): at 01:29

## 2018-12-23 RX ADMIN — Medication 100 MILLIGRAM(S): at 21:30

## 2018-12-23 RX ADMIN — Medication 100 MILLIGRAM(S): at 06:06

## 2018-12-23 RX ADMIN — PANTOPRAZOLE SODIUM 40 MILLIGRAM(S): 20 TABLET, DELAYED RELEASE ORAL at 06:06

## 2018-12-23 RX ADMIN — Medication 650 MILLIGRAM(S): at 22:30

## 2018-12-23 RX ADMIN — Medication 200 MILLIGRAM(S): at 20:03

## 2018-12-23 RX ADMIN — Medication 200 MILLIGRAM(S): at 08:30

## 2018-12-23 RX ADMIN — MORPHINE SULFATE 15 MILLIGRAM(S): 50 CAPSULE, EXTENDED RELEASE ORAL at 04:50

## 2018-12-23 RX ADMIN — Medication 250 MILLIGRAM(S): at 17:43

## 2018-12-23 NOTE — PROGRESS NOTE ADULT - SUBJECTIVE AND OBJECTIVE BOX
RUTH CAMPOS  ----------------------------------------  The patient was seen and evaluated for colitis.  The patient is in no acute distress.  Denied any chest pain, palpitations, dyspnea, or abdominal pain.  Reported an episode of watery, non-bloody diarrhea last night but none this morning.    Vital Signs Last 24 Hrs  T(C): 37.1 (23 Dec 2018 07:57), Max: 37.2 (22 Dec 2018 10:49)  T(F): 98.7 (23 Dec 2018 07:57), Max: 98.9 (22 Dec 2018 10:49)  HR: 59 (23 Dec 2018 07:57) (59 - 92)  BP: 102/58 (23 Dec 2018 07:57) (102/58 - 133/73)  BP(mean): --  RR: 18 (23 Dec 2018 07:57) (18 - 18)  SpO2: 96% (23 Dec 2018 07:57) (96% - 96%)    PHYSICAL EXAMINATION:  ----------------------------------------  General appearance: No acute distress, Awake, Alert  HEENT: Normocephalic, Atraumatic, Conjunctiva clear, EOMI  Neck: Supple, No JVD, No tenderness  Lungs: Clear to auscultation, Breath sound equal bilaterally, No wheezes, No rales  Cardiovascular: S1S2, Regular rhythm  Abdomen: Soft, Nontender, Nondistended, No guarding/rebound, Positive bowel sounds  Extremities: No clubbing, No cyanosis, No edema, No calf tenderness  Neuro: Strength equal bilaterally, No tremors  Psychiatric: Appropriate mood, Normal affect    LABORATORY STUDIES:  ----------------------------------------             12.0   9.6   )-----------( 188      ( 23 Dec 2018 07:20 )             36.5     12-23    145  |  109<H>  |  <3.0<L>  ----------------------------<  87  4.2   |  24.0  |  0.51    Ca    8.5<L>      23 Dec 2018 07:20    Culture - Urine (collected 20 Dec 2018 14:13)  Source: .Urine  Final Report (21 Dec 2018 09:30):    No growth    Culture - Blood (collected 20 Dec 2018 11:52)  Source: .Blood  Preliminary Report (22 Dec 2018 13:02):    No growth at 48 hours    Culture - Blood (collected 20 Dec 2018 11:52)  Source: .Blood  Preliminary Report (22 Dec 2018 13:02):    No growth at 48 hours    MEDICATIONS  (STANDING):  ATENolol  Tablet 25 milliGRAM(s) Oral daily  atorvastatin 10 milliGRAM(s) Oral at bedtime  ciprofloxacin   IVPB 400 milliGRAM(s) IV Intermittent every 12 hours  metroNIDAZOLE  IVPB 500 milliGRAM(s) IV Intermittent every 8 hours  pantoprazole    Tablet 40 milliGRAM(s) Oral before breakfast  saccharomyces boulardii 250 milliGRAM(s) Oral two times a day    MEDICATIONS  (PRN):  acetaminophen   Tablet .. 650 milliGRAM(s) Oral every 6 hours PRN Temp greater or equal to 38C (100.4F), Mild Pain (1 - 3)  cyclobenzaprine 10 milliGRAM(s) Oral at bedtime PRN Muscle Spasm  morphine  - Injectable 4 milliGRAM(s) IV Push every 6 hours PRN breakthrough pain  morphine  IR 15 milliGRAM(s) Oral every 4 hours PRN mod to severe pain  trimethobenzamide Injectable 200 milliGRAM(s) IntraMuscular three times a day PRN Nausea and/or Vomiting      ASSESSMENT / PLAN:  ----------------------------------------  Colitis / Hematochezia - On intravenous antibiotics. Fever and leukocytosis resolved. No further bleeding. Stool study results to be followed. The patient  had an episode of diarrhea yesterday night but tolerated breakfast today with good appetite. To advance diet as tolerated. On ciprofloxacin and metronidazole. Stool study results pending.    Acute blood loss anemia - Improved on repeat CBC. No further bleeding noted.    Back pain - Analgesic medications as needed.    Hypertension - Close blood pressure monitoring. On atenolol.    Hypokalemia - Potassium supplemented with improvement. RUTH CAMPOS  ----------------------------------------  The patient was seen and evaluated for colitis.  The patient is in no acute distress.  Denied any chest pain, palpitations, dyspnea, or abdominal pain.  Reported an episode of watery, non-bloody diarrhea last night but none this morning.    HPI:  73F presented with diarrhea and rectal bleeding. CT angiogram of the abdomen noted no active gastrointestinal hemorrhage but did note segmental colitis from the distal transverse to the sigmoid colon. r. Intravenous antibiotics were initiated for colitis and the patient was seen by Gastroenterology in consultation. Repeat laboratory studies noted anemia but the bleeding had subsided. The patient had improvement in the diarrhea and abdominal pain and the diet was advanced.    Vital Signs Last 24 Hrs  T(C): 37.1 (23 Dec 2018 07:57), Max: 37.2 (22 Dec 2018 10:49)  T(F): 98.7 (23 Dec 2018 07:57), Max: 98.9 (22 Dec 2018 10:49)  HR: 59 (23 Dec 2018 07:57) (59 - 92)  BP: 102/58 (23 Dec 2018 07:57) (102/58 - 133/73)  BP(mean): --  RR: 18 (23 Dec 2018 07:57) (18 - 18)  SpO2: 96% (23 Dec 2018 07:57) (96% - 96%)    PHYSICAL EXAMINATION:  ----------------------------------------  General appearance: No acute distress, Awake, Alert  HEENT: Normocephalic, Atraumatic, Conjunctiva clear, EOMI  Neck: Supple, No JVD, No tenderness  Lungs: Clear to auscultation, Breath sound equal bilaterally, No wheezes, No rales  Cardiovascular: S1S2, Regular rhythm  Abdomen: Soft, Nontender, Nondistended, No guarding/rebound, Positive bowel sounds  Extremities: No clubbing, No cyanosis, No edema, No calf tenderness  Neuro: Strength equal bilaterally, No tremors  Psychiatric: Appropriate mood, Normal affect    LABORATORY STUDIES:  ----------------------------------------             12.0   9.6   )-----------( 188      ( 23 Dec 2018 07:20 )             36.5     12-23    145  |  109<H>  |  <3.0<L>  ----------------------------<  87  4.2   |  24.0  |  0.51    Ca    8.5<L>      23 Dec 2018 07:20    Culture - Urine (collected 20 Dec 2018 14:13)  Source: .Urine  Final Report (21 Dec 2018 09:30):    No growth    Culture - Blood (collected 20 Dec 2018 11:52)  Source: .Blood  Preliminary Report (22 Dec 2018 13:02):    No growth at 48 hours    Culture - Blood (collected 20 Dec 2018 11:52)  Source: .Blood  Preliminary Report (22 Dec 2018 13:02):    No growth at 48 hours    MEDICATIONS  (STANDING):  ATENolol  Tablet 25 milliGRAM(s) Oral daily  atorvastatin 10 milliGRAM(s) Oral at bedtime  ciprofloxacin   IVPB 400 milliGRAM(s) IV Intermittent every 12 hours  metroNIDAZOLE  IVPB 500 milliGRAM(s) IV Intermittent every 8 hours  pantoprazole    Tablet 40 milliGRAM(s) Oral before breakfast  saccharomyces boulardii 250 milliGRAM(s) Oral two times a day    MEDICATIONS  (PRN):  acetaminophen   Tablet .. 650 milliGRAM(s) Oral every 6 hours PRN Temp greater or equal to 38C (100.4F), Mild Pain (1 - 3)  cyclobenzaprine 10 milliGRAM(s) Oral at bedtime PRN Muscle Spasm  morphine  - Injectable 4 milliGRAM(s) IV Push every 6 hours PRN breakthrough pain  morphine  IR 15 milliGRAM(s) Oral every 4 hours PRN mod to severe pain  trimethobenzamide Injectable 200 milliGRAM(s) IntraMuscular three times a day PRN Nausea and/or Vomiting      ASSESSMENT / PLAN:  ----------------------------------------  Colitis / Hematochezia - On intravenous antibiotics. Fever and leukocytosis resolved. No further bleeding. Stool study results to be followed. The patient  had an episode of diarrhea yesterday night but tolerated breakfast today with good appetite. To advance diet as tolerated. On ciprofloxacin and metronidazole. Stool study results pending.    Acute blood loss anemia - Improved on repeat CBC. No further bleeding noted.    Back pain - Analgesic medications as needed.    Hypertension - Close blood pressure monitoring. On atenolol.    Hypokalemia - Potassium supplemented with improvement.

## 2018-12-23 NOTE — PROGRESS NOTE ADULT - SUBJECTIVE AND OBJECTIVE BOX
Patient seen and examined; chart reviewed.  No fever;  Diet actually advanced to DASH and tolerated.  Pt had a large watery BM yesterday without bleeding and subsequently felt much better.  Nausea and tenderness are nearly gone.  Appetite has returned.  Antibiotics unchanged.  C/F.  Had a small semi-formed BM today.    Last colonoscopy was 10 yrs ago.      PAST MEDICAL & SURGICAL HISTORY:  Back pain  Hyperlipidemia  Hypertension  H/O spinal fusion      ROS:  No Heartburn, regurgitation, dysphagia, odynophagia.  No dyspepsia  No abdominal pain.    No Nausea, vomiting.  No Bleeding.  No hematemesis.   No diarrhea.    No hematochesia.  No weight loss, anorexia.  No edema.      MEDICATIONS  (STANDING):  ATENolol  Tablet 25 milliGRAM(s) Oral daily  atorvastatin 10 milliGRAM(s) Oral at bedtime  ciprofloxacin   IVPB 400 milliGRAM(s) IV Intermittent every 12 hours  metroNIDAZOLE  IVPB 500 milliGRAM(s) IV Intermittent every 8 hours  pantoprazole    Tablet 40 milliGRAM(s) Oral before breakfast  saccharomyces boulardii 250 milliGRAM(s) Oral two times a day    MEDICATIONS  (PRN):  acetaminophen   Tablet .. 650 milliGRAM(s) Oral every 6 hours PRN Temp greater or equal to 38C (100.4F), Mild Pain (1 - 3)  cyclobenzaprine 10 milliGRAM(s) Oral at bedtime PRN Muscle Spasm  morphine  - Injectable 4 milliGRAM(s) IV Push every 6 hours PRN breakthrough pain  morphine  IR 15 milliGRAM(s) Oral every 4 hours PRN mod to severe pain  trimethobenzamide Injectable 200 milliGRAM(s) IntraMuscular three times a day PRN Nausea and/or Vomiting    Allergies  sulfa drugs (Unknown)    Vital Signs Last 24 Hrs  T(C): 36.9 (23 Dec 2018 15:48), Max: 37.1 (22 Dec 2018 23:59)  T(F): 98.4 (23 Dec 2018 15:48), Max: 98.7 (22 Dec 2018 23:59)  HR: 61 (23 Dec 2018 15:48) (59 - 87)  BP: 113/67 (23 Dec 2018 15:48) (102/58 - 133/73)  BP(mean): --  RR: 18 (23 Dec 2018 07:57) (18 - 18)  SpO2: 96% (23 Dec 2018 07:57) (96% - 96%)    PHYSICAL EXAM:    GENERAL: NAD, well-groomed, well-developed  HEAD:  Atraumatic, Normocephalic  EYES: EOMI, PERRLA, conjunctiva and sclera clear  ENMT: No tonsillar erythema, exudates, or enlargement; Moist mucous membranes, Good dentition, No lesions  NECK: Supple, No JVD, Normal thyroid  CHEST/LUNG: Clear to percussion bilaterally; No rales, rhonchi, wheezing, or rubs  HEART: Regular rate and rhythm; No murmurs, rubs, or gallops  ABDOMEN: Soft, Nontender, Nondistended; Bowel sounds present  EXTREMITIES:  2+ Peripheral Pulses, No clubbing, cyanosis, or edema  LYMPH: No lymphadenopathy noted  SKIN: No rashes or lesions      LABS:                        12.0   9.6   )-----------( 188      ( 23 Dec 2018 07:20 )             36.5     12-23    145  |  109<H>  |  <3.0<L>  ----------------------------<  87  4.2   |  24.0  |  0.51    Ca    8.5<L>      23 Dec 2018 07:20               RADIOLOGY & ADDITIONAL STUDIES:

## 2018-12-23 NOTE — PROGRESS NOTE ADULT - ASSESSMENT
Acute ischemic colitis markedly improved in the last 18 hrs.  Diet changed to DASH/ Low fiber (Residue).    If continues to tolerate diet and remain pain free, then would anticipate discharge in AM.    Eventual colonoscopy to be arranged via GI office follow up.   Low residue diet for another 2 weeks.

## 2018-12-24 ENCOUNTER — TRANSCRIPTION ENCOUNTER (OUTPATIENT)
Age: 73
End: 2018-12-24

## 2018-12-24 VITALS
SYSTOLIC BLOOD PRESSURE: 116 MMHG | OXYGEN SATURATION: 96 % | DIASTOLIC BLOOD PRESSURE: 73 MMHG | HEART RATE: 69 BPM | TEMPERATURE: 99 F | RESPIRATION RATE: 18 BRPM

## 2018-12-24 LAB
CULTURE RESULTS: SIGNIFICANT CHANGE UP
SPECIMEN SOURCE: SIGNIFICANT CHANGE UP

## 2018-12-24 PROCEDURE — 87507 IADNA-DNA/RNA PROBE TQ 12-25: CPT

## 2018-12-24 PROCEDURE — 87086 URINE CULTURE/COLONY COUNT: CPT

## 2018-12-24 PROCEDURE — 99232 SBSQ HOSP IP/OBS MODERATE 35: CPT

## 2018-12-24 PROCEDURE — 80048 BASIC METABOLIC PNL TOTAL CA: CPT

## 2018-12-24 PROCEDURE — 93005 ELECTROCARDIOGRAM TRACING: CPT

## 2018-12-24 PROCEDURE — 85027 COMPLETE CBC AUTOMATED: CPT

## 2018-12-24 PROCEDURE — 85730 THROMBOPLASTIN TIME PARTIAL: CPT

## 2018-12-24 PROCEDURE — 87046 STOOL CULTR AEROBIC BACT EA: CPT

## 2018-12-24 PROCEDURE — 96361 HYDRATE IV INFUSION ADD-ON: CPT

## 2018-12-24 PROCEDURE — 99239 HOSP IP/OBS DSCHRG MGMT >30: CPT

## 2018-12-24 PROCEDURE — 80053 COMPREHEN METABOLIC PANEL: CPT

## 2018-12-24 PROCEDURE — 96375 TX/PRO/DX INJ NEW DRUG ADDON: CPT

## 2018-12-24 PROCEDURE — 87177 OVA AND PARASITES SMEARS: CPT

## 2018-12-24 PROCEDURE — 83735 ASSAY OF MAGNESIUM: CPT

## 2018-12-24 PROCEDURE — 87040 BLOOD CULTURE FOR BACTERIA: CPT

## 2018-12-24 PROCEDURE — 81001 URINALYSIS AUTO W/SCOPE: CPT

## 2018-12-24 PROCEDURE — 99285 EMERGENCY DEPT VISIT HI MDM: CPT | Mod: 25

## 2018-12-24 PROCEDURE — 83605 ASSAY OF LACTIC ACID: CPT

## 2018-12-24 PROCEDURE — 85610 PROTHROMBIN TIME: CPT

## 2018-12-24 PROCEDURE — 87493 C DIFF AMPLIFIED PROBE: CPT

## 2018-12-24 PROCEDURE — 86901 BLOOD TYPING SEROLOGIC RH(D): CPT

## 2018-12-24 PROCEDURE — 87045 FECES CULTURE AEROBIC BACT: CPT

## 2018-12-24 PROCEDURE — 86900 BLOOD TYPING SEROLOGIC ABO: CPT

## 2018-12-24 PROCEDURE — 96374 THER/PROPH/DIAG INJ IV PUSH: CPT | Mod: XU

## 2018-12-24 PROCEDURE — 86850 RBC ANTIBODY SCREEN: CPT

## 2018-12-24 PROCEDURE — 36415 COLL VENOUS BLD VENIPUNCTURE: CPT

## 2018-12-24 PROCEDURE — 74174 CTA ABD&PLVS W/CONTRAST: CPT

## 2018-12-24 PROCEDURE — 82272 OCCULT BLD FECES 1-3 TESTS: CPT

## 2018-12-24 RX ORDER — CIPROFLOXACIN LACTATE 400MG/40ML
1 VIAL (ML) INTRAVENOUS
Qty: 6 | Refills: 0
Start: 2018-12-24 | End: 2018-12-26

## 2018-12-24 RX ORDER — METRONIDAZOLE 500 MG
1 TABLET ORAL
Qty: 9 | Refills: 0
Start: 2018-12-24 | End: 2018-12-26

## 2018-12-24 RX ADMIN — PANTOPRAZOLE SODIUM 40 MILLIGRAM(S): 20 TABLET, DELAYED RELEASE ORAL at 05:40

## 2018-12-24 RX ADMIN — ATENOLOL 25 MILLIGRAM(S): 25 TABLET ORAL at 05:40

## 2018-12-24 RX ADMIN — Medication 100 MILLIGRAM(S): at 05:41

## 2018-12-24 RX ADMIN — MORPHINE SULFATE 15 MILLIGRAM(S): 50 CAPSULE, EXTENDED RELEASE ORAL at 03:28

## 2018-12-24 RX ADMIN — Medication 250 MILLIGRAM(S): at 05:40

## 2018-12-24 NOTE — PROGRESS NOTE ADULT - ASSESSMENT
This is a 73-year-old woman presenting with hematochezia, found to have ischemic colitis, currently tolerating diet, on antibiotics, and diarrhea has resolved.  Patient would very much like to go home, and there are no GI concerns to keeping her hospitalized.  Patient should follow up with her PCP/GI Dr. Brandon Ba within two weeks of discharge.    Thank you for the consult.  GI will sign off.  Please reconsult as needed and call with any questions or concerns.     JOSE E Soliman MD  [facility] Physician [facility]  Division of Gastroenterology  Tel [phone]  Fax [phone]  12-24-18 @ 08:18

## 2018-12-24 NOTE — DISCHARGE NOTE ADULT - MEDICATION SUMMARY - MEDICATIONS TO TAKE
I will START or STAY ON the medications listed below when I get home from the hospital:    Flagyl 500 mg oral tablet  -- 1 tab(s) by mouth 3 times a day   -- Do not drink alcoholic beverages when taking this medication.  Finish all this medication unless otherwise directed by prescriber.  May discolor urine or feces.    -- Indication: For Colitis    morphine 15 mg oral capsule  -- 15 milligram(s) by mouth every 4 hours  -- Indication: For Pain    ondansetron 8 mg oral tablet  -- 1 tab(s) by mouth every 8 hours  --     -- Indication: For Nausea    Lipitor 10 mg oral tablet  -- 1 tab(s) by mouth once a day  -- Indication: For Hyperlipidemia    atenolol 25 mg oral tablet  -- 1 tab(s) by mouth once a day  -- Indication: For HTN    ciprofloxacin 500 mg oral tablet  -- 1 tab(s) by mouth every 12 hours   -- Avoid prolonged or excessive exposure to direct and/or artificial sunlight while taking this medication.  Check with your doctor before becoming pregnant.  Do not take dairy products, antacids, or iron preparations within one hour of this medication.  Finish all this medication unless otherwise directed by prescriber.  Medication should be taken with plenty of water.    -- Indication: For Colitis

## 2018-12-24 NOTE — PROGRESS NOTE ADULT - REASON FOR ADMISSION
hematochezia
hematochezia; Abnormal CT scan
hematochezia; ischemic colitis
hematochezia

## 2018-12-24 NOTE — PROGRESS NOTE ADULT - SUBJECTIVE AND OBJECTIVE BOX
Chief Complaint: This is a 73y old Female patient being seen for follow-up consultation for ischemic colitis.    HPI:  This is a 73-year-old woman being seen in consultation for hematochezia and found to have ischemic colitis.  Patient reports feeling much better today.  Denies any abdominal pain or diarrhea.  Tolerating a diet without issue.  Complains of a vague epigastric pain and accompanying sour taste.      REVIEW OF SYSTEMS: A 14-point review of systems was completed and was otherwise negative save what is reported in the HPI.    PAST MEDICAL/SURGICAL HISTORY:  Back pain  Hyperlipidemia  Hypertension  H/O spinal fusion    MEDICATIONS  (STANDING):  ATENolol  Tablet 25 milliGRAM(s) Oral daily  atorvastatin 10 milliGRAM(s) Oral at bedtime  ciprofloxacin   IVPB 400 milliGRAM(s) IV Intermittent every 12 hours  metroNIDAZOLE  IVPB 500 milliGRAM(s) IV Intermittent every 8 hours  pantoprazole    Tablet 40 milliGRAM(s) Oral before breakfast  saccharomyces boulardii 250 milliGRAM(s) Oral two times a day    MEDICATIONS  (PRN):  acetaminophen   Tablet .. 650 milliGRAM(s) Oral every 6 hours PRN Temp greater or equal to 38C (100.4F), Mild Pain (1 - 3)  cyclobenzaprine 10 milliGRAM(s) Oral at bedtime PRN Muscle Spasm  morphine  - Injectable 4 milliGRAM(s) IV Push every 6 hours PRN breakthrough pain  morphine  IR 15 milliGRAM(s) Oral every 4 hours PRN mod to severe pain  trimethobenzamide Injectable 200 milliGRAM(s) IntraMuscular three times a day PRN Nausea and/or Vomiting    VITAL SIGNS LAST 24 HOURS:  T(C): 37 (24 Dec 2018 07:53), Max: 37 (24 Dec 2018 07:53)  T(F): 98.6 (24 Dec 2018 07:53), Max: 98.6 (24 Dec 2018 07:53)  HR: 69 (24 Dec 2018 07:53) (61 - 69)  BP: 116/73 (24 Dec 2018 07:53) (104/65 - 130/74)  RR: 18 (24 Dec 2018 07:53) (18 - 18)  SpO2: 96% (24 Dec 2018 07:53) (96% - 96%)    12-23-18 @ 07:01  -  12-24-18 @ 07:00  --------------------------------------------------------  IN: 200 mL / OUT: 0 mL / NET: 200 mL      PHYSICAL EXAM:  Constitutional: Well-developed, well-nourished, in no apparent distress  Eyes: Sclerae anicteric, conjunctivae normal  ENMT: Mucus membranes moist, no oropharyngeal thrush noted  Neck: No thyroid nodules appreciated, no significant cervical or supraclavicular lymphadenopathy  Respiratory: Breathing nonlabored; clear to auscultation  Cardiovascular: Regular rate and rhythm  Gastrointestinal: Soft, LLQ tenderness, nondistended, normoactive bowel sounds; no hepatosplenomegaly appreciated; no rebound tenderness or involuntary guarding  Extremities: No clubbing, cyanosis or edema  Neurological: Alert and oriented to person, place and time; no asterixis  Skin: No jaundice  Lymph Nodes: No significant lymphadenopathy  Musculoskeletal: No significant peripheral atrophy  Psychiatric: Affect and mood appropriate                            12.0   9.6   )-----------( 188      ( 23 Dec 2018 07:20 )             36.5     12-23    145  |  109<H>  |  <3.0<L>  ----------------------------<  87  4.2   |  24.0  |  0.51    Ca    8.5<L>      23 Dec 2018 07:20    GI PCR Panel, Stool (collected 23 Dec 2018 00:39)  Source: .Stool  Final Report (23 Dec 2018 12:10):    GI PCR Results: NOT detected    *******Please Note:*******    GI panel PCR evaluates for:    Campylobacter, Plesiomonas shigelloides, Salmonella,    Vibrio, Yersinia enterocolitica, Enteroaggregative    Escherichia coli (EAEC), Enteropathogenic E.coli (EPEC),    Enterotoxigenic E. coli (ETEC) lt/st, Shiga-like    toxin-producing E. coli (STEC) stx1/stx2,    Shigella/ Enteroinvasive E. coli (EIEC), Cryptosporidium,    Cyclospora cayetanensis, Entamoeba histolytica,    Giardia lamblia, Adenovirus F 40/41, Astrovirus,    Norovirus GI/GII, Rotavirus A, Sapovirus    Culture - Stool (collected 22 Dec 2018 07:49)  Source: .Stool  Preliminary Report (23 Dec 2018 11:43):    Culture in progress    IMAGING: I personally reviewed the CTA, and I agree with the radiologist's interpretation.

## 2018-12-24 NOTE — DISCHARGE NOTE ADULT - PLAN OF CARE
. Complete the antibiotics as prescribed. Follow up with your primary care physician for further management. Continue on your home pain medications. Continue on atorvastatin.

## 2018-12-24 NOTE — DISCHARGE NOTE ADULT - CARE PLAN
Principal Discharge DX:	Colitis  Goal:	.  Assessment and plan of treatment:	Complete the antibiotics as prescribed. Follow up with your primary care physician for further management.  Secondary Diagnosis:	Back pain  Assessment and plan of treatment:	Continue on your home pain medications.  Secondary Diagnosis:	Hyperlipidemia  Assessment and plan of treatment:	Continue on atorvastatin.

## 2018-12-24 NOTE — DISCHARGE NOTE ADULT - HOSPITAL COURSE
73F presented with diarrhea and rectal bleeding. On presentation, Temp(101.3), WBC(16.3), H/H(13.8/41.6). CT angiogram of the abdomen noted no active gastrointestinal hemorrhage, noted segmental colitis from the distal transverse to the sigmoid colon, no pneumatosis or free air. Intravenous antibiotics were initiated for colitis and the patient was seen by Gastroenterology in consultation. She was noted to have had prior colonoscopies in the past without significant findings. Repeat laboratory studies noted anemia but the bleeding had subsided. The patient had improvement in the diarrhea and abdominal pain and the diet was advanced. Stool culture and PCR were negative. Repeat laboratory studies noted improvement in the anemia. The patient was discharged home with instructions to follow up with her primary care physician for further management.    35 minutes total time

## 2018-12-24 NOTE — PROGRESS NOTE ADULT - SUBJECTIVE AND OBJECTIVE BOX
RUTH CAMPOS  ----------------------------------------  The patient was seen and evaluated for colitis.  The patient is in no acute distress.  Denied any chest pain, palpitations, dyspnea, or abdominal pain.  Tolerated oral intake.    Vital Signs Last 24 Hrs  T(C): 37 (24 Dec 2018 07:53), Max: 37 (24 Dec 2018 07:53)  T(F): 98.6 (24 Dec 2018 07:53), Max: 98.6 (24 Dec 2018 07:53)  HR: 69 (24 Dec 2018 07:53) (61 - 69)  BP: 116/73 (24 Dec 2018 07:53) (104/65 - 130/74)  BP(mean): --  RR: 18 (24 Dec 2018 07:53) (18 - 18)  SpO2: 96% (24 Dec 2018 07:53) (96% - 96%)    PHYSICAL EXAMINATION:  ----------------------------------------  General appearance: No acute distress, Awake, Alert  HEENT: Normocephalic, Atraumatic, Conjunctiva clear, EOMI  Neck: Supple, No JVD, No tenderness  Lungs: Clear to auscultation, Breath sound equal bilaterally, No wheezes, No rales  Cardiovascular: S1S2, Regular rhythm  Abdomen: Soft, Nontender, Nondistended, No guarding/rebound, Positive bowel sounds  Extremities: No clubbing, No cyanosis, No edema, No calf tenderness  Neuro: Strength equal bilaterally, No tremors  Psychiatric: Appropriate mood, Normal affect    LABORATORY STUDIES:  ----------------------------------------             12.0   9.6   )-----------( 188      ( 23 Dec 2018 07:20 )             36.5     12-23    145  |  109<H>  |  <3.0<L>  ----------------------------<  87  4.2   |  24.0  |  0.51    Ca    8.5<L>      23 Dec 2018 07:20    GI PCR Panel, Stool (collected 23 Dec 2018 00:39)  Source: .Stool  Final Report (23 Dec 2018 12:10):    GI PCR Results: NOT detected    *******Please Note:*******    GI panel PCR evaluates for:    Campylobacter, Plesiomonas shigelloides, Salmonella,    Vibrio, Yersinia enterocolitica, Enteroaggregative    Escherichia coli (EAEC), Enteropathogenic E.coli (EPEC),    Enterotoxigenic E. coli (ETEC) lt/st, Shiga-like    toxin-producing E. coli (STEC) stx1/stx2,    Shigella/ Enteroinvasive E. coli (EIEC), Cryptosporidium,    Cyclospora cayetanensis, Entamoeba histolytica,    Giardia lamblia, Adenovirus F 40/41, Astrovirus,    Norovirus GI/GII, Rotavirus A, Sapovirus    Culture - Stool (collected 22 Dec 2018 07:49)  Source: .Stool  Preliminary Report (23 Dec 2018 11:43):    Culture in progress    MEDICATIONS  (STANDING):  ATENolol  Tablet 25 milliGRAM(s) Oral daily  atorvastatin 10 milliGRAM(s) Oral at bedtime  ciprofloxacin   IVPB 400 milliGRAM(s) IV Intermittent every 12 hours  metroNIDAZOLE  IVPB 500 milliGRAM(s) IV Intermittent every 8 hours  pantoprazole    Tablet 40 milliGRAM(s) Oral before breakfast  saccharomyces boulardii 250 milliGRAM(s) Oral two times a day    MEDICATIONS  (PRN):  acetaminophen   Tablet .. 650 milliGRAM(s) Oral every 6 hours PRN Temp greater or equal to 38C (100.4F), Mild Pain (1 - 3)  cyclobenzaprine 10 milliGRAM(s) Oral at bedtime PRN Muscle Spasm  morphine  - Injectable 4 milliGRAM(s) IV Push every 6 hours PRN breakthrough pain  morphine  IR 15 milliGRAM(s) Oral every 4 hours PRN mod to severe pain  trimethobenzamide Injectable 200 milliGRAM(s) IntraMuscular three times a day PRN Nausea and/or Vomiting      ASSESSMENT / PLAN:  ----------------------------------------  Colitis / Hematochezia - Fever and leukocytosis resolved. No further bleeding. Stool study were negative. Tolerating oral intake.    Acute blood loss anemia - No further bleeding noted. CBC with improved blood count.    Back pain - Analgesic medications as needed.    Hypertension - Close blood pressure monitoring. On atenolol.    Hypokalemia - Potassium supplemented with improvement.

## 2018-12-24 NOTE — DISCHARGE NOTE ADULT - PATIENT PORTAL LINK FT
You can access the [facility] Patient Portal, offered by [facility], by registering with the following website: [email or web address]

## 2018-12-24 NOTE — DISCHARGE NOTE ADULT - CARE PROVIDER_API CALL
Brandon Ba), Gastroenterology; Internal Medicine  [address] [place] [address]  Phone: [phone]  Fax: [phone]

## 2018-12-25 LAB
CULTURE RESULTS: SIGNIFICANT CHANGE UP
CULTURE RESULTS: SIGNIFICANT CHANGE UP
SPECIMEN SOURCE: SIGNIFICANT CHANGE UP
SPECIMEN SOURCE: SIGNIFICANT CHANGE UP

## 2018-12-26 LAB
CULTURE RESULTS: SIGNIFICANT CHANGE UP
SPECIMEN SOURCE: SIGNIFICANT CHANGE UP

## 2019-01-07 ENCOUNTER — APPOINTMENT (OUTPATIENT)
Dept: GASTROENTEROLOGY | Facility: CLINIC | Age: 74
End: 2019-01-07
Payer: MEDICARE

## 2019-01-07 VITALS
SYSTOLIC BLOOD PRESSURE: 141 MMHG | DIASTOLIC BLOOD PRESSURE: 83 MMHG | BODY MASS INDEX: 23.32 KG/M2 | HEART RATE: 84 BPM | HEIGHT: 65 IN | WEIGHT: 140 LBS

## 2019-01-07 DIAGNOSIS — K55.9 VASCULAR DISORDER OF INTESTINE, UNSPECIFIED: ICD-10-CM

## 2019-01-07 PROCEDURE — 99214 OFFICE O/P EST MOD 30 MIN: CPT

## 2019-01-07 RX ORDER — MORPHINE SULFATE 15 MG/1
15 TABLET ORAL
Refills: 0 | Status: ACTIVE | COMMUNITY

## 2019-01-07 RX ORDER — ATORVASTATIN CALCIUM 10 MG/1
10 TABLET, FILM COATED ORAL
Refills: 0 | Status: ACTIVE | COMMUNITY

## 2019-01-07 RX ORDER — CYCLOBENZAPRINE HCL 10 MG
10 TABLET ORAL
Refills: 0 | Status: ACTIVE | COMMUNITY

## 2019-01-07 RX ORDER — ATENOLOL 25 MG/1
25 TABLET ORAL
Refills: 0 | Status: ACTIVE | COMMUNITY

## 2019-01-07 RX ORDER — POLYETHYLENE GLYOCOL 3350, SODIUM CHLORIDE, SODIUM BICARBONATE AND POTASSIUM CHLORIDE 420; 11.2; 5.72; 1.48 G/4L; G/4L; G/4L; G/4L
420 POWDER, FOR SOLUTION NASOGASTRIC; ORAL
Qty: 1 | Refills: 0 | Status: ACTIVE | COMMUNITY
Start: 2019-01-07 | End: 1900-01-01

## 2019-01-07 NOTE — ASSESSMENT
[FreeTextEntry1] : Recent bout of documented ischemic colitis requiring hospitalization for about 5 days later in December, 2018. She did conservatively and bleeding, and symptoms resolved. Concluded antibiotics on 12/27. Doing well on low-residue diet. Advised to advance to high fiber diet\par Call for Any recurrent symptoms. Advised patient to plan to have colonoscopy in about 3 months for evaluation of the colon to exclude chronic colitis or neoplasia. The procedure, its risks, benefits, and prepped, were explained to the patient, who understands and is agreeable to proceed. Try light prep will be utilized. Blood work will be repeated prior to the procedure. ASA #2. Patient is an optimal medical condition to undergo planned procedure. Results to follow. Arranged.\par \par Positive Cologuard test:  Rule out colon neoplasia. Colonoscopy as above.\par \par Persistent nausea and upper abdominal distress x3 months with nonspecific weight loss of about 10 pounds. Rule out upper GI pathology. Upper endoscopy offered to the patient. The procedure, its risks, benefits, and prepped, were explained to the patient, on stent and is agreeable to proceed. #2. We'll arrange. Urea breath test to precede the procedure and treat accordingly.

## 2019-01-07 NOTE — HISTORY OF PRESENT ILLNESS
[FreeTextEntry1] : 73-year-old white female with history of hypertension, and hyperlipidemia, and negative colonoscopy 10 and 20 years ago, who presented to [facility] in 12/20/2018 with left-sided abdominal pain and bloody diarrhea. Vein preceded the passage of diarrhea, then, blood. Hemoglobins remained around 10, no transfusions required. CAT scan showed a long segment of colitis, involving the distal transverse colon, and descending colon, and splenic flexure. Patient was clinically diagnosed with ischemic colitis and supported. IV fluids and IV antibiotics, Cipro and Flagyl were administered. Patient improved gradually and left the hospital was no diarrhea or bleeding. Stool studies were negative. Antibiotics, Cipro and Flagyl were continued for a total of 7 days on  inpatient plus outpatient basis. Antibiotics were concluded about 2 weeks ago. Patient has been observing a low-residue diet and had been having soft bowel movements, 1-2 per day. Currently, he had a normal formed bowel movement today. She has been pain-free. No recurrence of pain or bleeding or diarrhea. Looks and feels well. No new medications. Patient has not restarted her prophylactic aspirin.\par Patient reports that prior to becoming ill, a colo-Guard stool test was performed and was reported to be positive. She was intending to schedule a followup colonoscopy, nonetheless. \par Family history is negative for colorectal neoplasia. Screening colonoscopies have been negative 10 and 20 years ago.\par Patient reports that she has been recently having episodes of nausea about 3 times per week without vomiting. There she has sustained about a 10 pound weight loss over the past 3 months. No epigastric pain. No use of aspirin or NSAIDs. No overt upper GI bleeding. No dysphagia or odynophagia.

## 2019-01-07 NOTE — PHYSICAL EXAM
[General Appearance - Alert] : alert [General Appearance - In No Acute Distress] : in no acute distress [Outer Ear] : the ears and nose were normal in appearance [Oropharynx] : the oropharynx was normal [Neck Appearance] : the appearance of the neck was normal [Neck Cervical Mass (___cm)] : no neck mass was observed [Jugular Venous Distention Increased] : there was no jugular-venous distention [Thyroid Diffuse Enlargement] : the thyroid was not enlarged [Thyroid Nodule] : there were no palpable thyroid nodules [Auscultation Breath Sounds / Voice Sounds] : lungs were clear to auscultation bilaterally [Heart Rate And Rhythm] : heart rate was normal and rhythm regular [Heart Sounds] : normal S1 and S2 [Heart Sounds Gallop] : no gallops [Murmurs] : no murmurs [Heart Sounds Pericardial Friction Rub] : no pericardial rub [Bowel Sounds] : normal bowel sounds [Abdomen Soft] : soft [Abdomen Tenderness] : non-tender [] : no hepato-splenomegaly [Abdomen Mass (___ Cm)] : no abdominal mass palpated [No CVA Tenderness] : no ~M costovertebral angle tenderness [No Spinal Tenderness] : no spinal tenderness [Abnormal Walk] : normal gait [Nail Clubbing] : no clubbing  or cyanosis of the fingernails [Musculoskeletal - Swelling] : no joint swelling seen [Motor Tone] : muscle strength and tone were normal

## 2019-02-25 ENCOUNTER — APPOINTMENT (OUTPATIENT)
Dept: MAMMOGRAPHY | Facility: CLINIC | Age: 74
End: 2019-02-25
Payer: MEDICARE

## 2019-02-25 ENCOUNTER — OUTPATIENT (OUTPATIENT)
Dept: OUTPATIENT SERVICES | Facility: HOSPITAL | Age: 74
LOS: 1 days | End: 2019-02-25
Payer: MEDICARE

## 2019-02-25 DIAGNOSIS — Z12.31 ENCOUNTER FOR SCREENING MAMMOGRAM FOR MALIGNANT NEOPLASM OF BREAST: ICD-10-CM

## 2019-02-25 DIAGNOSIS — Z98.1 ARTHRODESIS STATUS: Chronic | ICD-10-CM

## 2019-02-25 PROCEDURE — 77063 BREAST TOMOSYNTHESIS BI: CPT

## 2019-02-25 PROCEDURE — 77067 SCR MAMMO BI INCL CAD: CPT | Mod: 26

## 2019-02-25 PROCEDURE — 77067 SCR MAMMO BI INCL CAD: CPT

## 2019-02-25 PROCEDURE — 77063 BREAST TOMOSYNTHESIS BI: CPT | Mod: 26

## 2019-03-07 ENCOUNTER — APPOINTMENT (OUTPATIENT)
Dept: FAMILY MEDICINE | Facility: CLINIC | Age: 74
End: 2019-03-07

## 2019-04-24 ENCOUNTER — APPOINTMENT (OUTPATIENT)
Dept: GASTROENTEROLOGY | Facility: GI CENTER | Age: 74
End: 2019-04-24

## 2020-11-04 ENCOUNTER — APPOINTMENT (OUTPATIENT)
Dept: ORTHOPEDIC SURGERY | Facility: CLINIC | Age: 75
End: 2020-11-04
Payer: MEDICARE

## 2020-11-04 VITALS
HEIGHT: 65 IN | SYSTOLIC BLOOD PRESSURE: 173 MMHG | HEART RATE: 85 BPM | BODY MASS INDEX: 22.66 KG/M2 | DIASTOLIC BLOOD PRESSURE: 97 MMHG | WEIGHT: 136 LBS

## 2020-11-04 DIAGNOSIS — F11.20 OPIOID DEPENDENCE, UNCOMPLICATED: ICD-10-CM

## 2020-11-04 DIAGNOSIS — Z87.39 PERSONAL HISTORY OF OTHER DISEASES OF THE MUSCULOSKELETAL SYSTEM AND CONNECTIVE TISSUE: ICD-10-CM

## 2020-11-04 DIAGNOSIS — Z80.9 FAMILY HISTORY OF MALIGNANT NEOPLASM, UNSPECIFIED: ICD-10-CM

## 2020-11-04 DIAGNOSIS — Z86.39 PERSONAL HISTORY OF OTHER ENDOCRINE, NUTRITIONAL AND METABOLIC DISEASE: ICD-10-CM

## 2020-11-04 DIAGNOSIS — Z86.79 PERSONAL HISTORY OF OTHER DISEASES OF THE CIRCULATORY SYSTEM: ICD-10-CM

## 2020-11-04 DIAGNOSIS — M17.11 UNILATERAL PRIMARY OSTEOARTHRITIS, RIGHT KNEE: ICD-10-CM

## 2020-11-04 DIAGNOSIS — Z78.9 OTHER SPECIFIED HEALTH STATUS: ICD-10-CM

## 2020-11-04 PROCEDURE — 99072 ADDL SUPL MATRL&STAF TM PHE: CPT

## 2020-11-04 PROCEDURE — 99204 OFFICE O/P NEW MOD 45 MIN: CPT

## 2020-11-04 RX ORDER — ACETAMINOPHEN 325 MG/1
TABLET, FILM COATED ORAL
Refills: 0 | Status: ACTIVE | COMMUNITY

## 2020-11-04 NOTE — PHYSICAL EXAM
[LE] : Sensory: Intact in bilateral lower extremities [ALL] : dorsalis pedis, posterior tibial, femoral, popliteal, and radial 2+ and symmetric bilaterally [de-identified] : GENERAL APPEARANCE: Well nourished and hydrated, pleasant, alert, and oriented x 3. Appears their stated age. \par HEENT: Normocephalic, extraocular eye motion intact. Nasal septum midline. Oral cavity clear. External auditory canal clear. \par RESPIRATORY: Breath sounds clear and audible in all lobes. No wheezing, No accessory muscle use.\par CARDIOVASCULAR: No apparent abnormalities. No lower leg edema. No varicosities. Pedal pulses are palpable.\par NEUROLOGIC: Sensation is normal, no muscle weakness in the upper or lower extremities.\par DERMATOLOGIC: No apparent skin lesions, moist, warm, no rash.\par SPINE: Cervical spine appears normal and moves freely; thoracic spine appears normal and moves freely; lumbosacral spine appears normal and moves freely, normal, nontender.\par MUSCULOSKELETAL: Hands, wrists, and elbows are normal and move freely, shoulders are normal and move freely.  [de-identified] : Right knee exam shows medial joint line tenderness, varus deformity [de-identified] : 3V xray of the right knee done in the office today and reviewed by Dr. Shon Contreras demonstrates bone on bone medial and patellofemoral osteoarthritis

## 2020-11-04 NOTE — HISTORY OF PRESENT ILLNESS
[Pain Location] : pain [Worsening] : worsening [2] : a current pain level of 2/10 [8] : a maximum pain level of 8/10 [Intermit.] : ~He/She~ states the symptoms seem to be intermittent [Bending] : worsened by bending [Walking] : worsened by walking [Ice] : relieved by ice [Rest] : relieved by rest [de-identified] : 73 y/o F presents with right knee pain. The pain has been present since August 2019. She reports more in medial knee pain and anterior knee. She notes more pain with walking and at the end of the day. In the morning, she does not have significant pain. She has pain in the medial and anterior aspect of the right knee. She feels some catching in her right knee. She is using morphine 15 mg 6 times a day. She tried cortisone injection in the knee by Dr. Funes and it only helped for 2 days. She is s/p back fustion X4 over 2 years. The pt had a right foot drop from 9120-8224.  [Ataxia] : no ataxia [Incontinence] : no incontinence [Loss of Dexterity] : good dexterity [Urinary Ret.] : no urinary retention [de-identified] : stairs

## 2020-11-04 NOTE — DISCUSSION/SUMMARY
Dr. Darwin Vogt to room on arrival with RN and patient [Medication Risks Reviewed] : Medication risks reviewed [de-identified] : 73 y/o F with bone on bone medial and patellofemoral osteoarthritis of the right knee. Conservative therapy and surgical options discussed in detail with the patient. The patient is a candidate for a right TKA. She has failed conservative therapies. We discussed pre-op, surgery, and post-op in detail. The patient takes a total of 90 mg of morphine a day--15 mg 6 times a day. Pt understands the risks associated with taking pain medication prior to surgery. We highly encourage the patient to reduce the amount of pain medication she takes before surgery. She should continue to consult Dr. Coty Wiseman to help reduce the amount of narcotics she takes. Pt will discuss the surgery with Dr. Coty Wiseman and her  and will contact the office when she is ready to pursue surgery. \par \par \par The patient is a 74 year individual with end stage arthritis of their right knee joint. Based upon the patient's continued symptoms and failure to respond to conservative treatment I have recommended a right total knee arthroplasty for this patient. A long discussion took place with the patient describing what a total joint replacement is and what the procedure would entail. A total knee arthroplasty model, similar to the implant that was used during the operation, was utilized to demonstrate and to discuss the various bearing surfaces of the implants. The hospitalization and post-operative care and rehabilitation were also discussed. The use of perioperative antibiotics and DVT prophylaxis were discussed. The risk, benefits and alternatives to a surgical intervention were discussed at length with the patient. The patient was also advised of risks related to the medical comorbidities, elevated body mass index (BMI), and smoking where applicable. We discussed how to reduce modifiable risk factors and encouraged smoking cessation were applicable.. A lengthy discussion took place to review the most common complications including but not limited to: deep vein thrombosis, pulmonary embolus, heart attack, stroke, infection, wound breakdown, numbness, damage to nerves, tendon, muscles, arteries or other blood vessels, death and other possible complications from anesthesia. The patient was told that we will take steps to minimize these risks by using sterile technique, antibiotics and DVT prophylaxis when appropriate and follow the patient postoperatively in the office setting to monitor progress. The possibility of recurrent pain, no improvement in pain and actual worsening of pain were also discussed with the patient.\par The discharge plan of care focused on the patient going home following surgery. The patient was encouraged to make the necessary arrangements to have someone stay with them when they are discharged home. Following discharge, a home care nurse was to the patient. The home care nurse would open the patient’s home care case and request home physical therapy services. Home physical therapy was to commence following discharge provided it was appropriate and covered by the health insurance benefit plan. \par The benefits of surgery were discussed with the patient including the potential for improving her current clinical condition through operative intervention. Alternatives to surgical intervention including continued conservative management were also discussed in detail. All questions were answered to the satisfaction of the patient. The treatment plan of care, as well as a model of a total knee arthroplasty equivalent to the one that will be used for their total joint replacement, was shared with the patient. The patient agreed to the plan of care as well as the use of implants in their total joint replacement.

## 2020-11-04 NOTE — END OF VISIT
[FreeTextEntry3] : I, Carroll Mcgee, acted solely as a scribe for Dr. Shon Contreras on this date 11/04/2020.

## 2020-12-10 ENCOUNTER — APPOINTMENT (OUTPATIENT)
Dept: MAMMOGRAPHY | Facility: CLINIC | Age: 75
End: 2020-12-10

## 2021-05-20 ENCOUNTER — OUTPATIENT (OUTPATIENT)
Dept: OUTPATIENT SERVICES | Facility: HOSPITAL | Age: 76
LOS: 1 days | End: 2021-05-20
Payer: MEDICARE

## 2021-05-20 ENCOUNTER — APPOINTMENT (OUTPATIENT)
Dept: MAMMOGRAPHY | Facility: CLINIC | Age: 76
End: 2021-05-20
Payer: MEDICARE

## 2021-05-20 DIAGNOSIS — Z98.1 ARTHRODESIS STATUS: Chronic | ICD-10-CM

## 2021-05-20 DIAGNOSIS — Z00.00 ENCOUNTER FOR GENERAL ADULT MEDICAL EXAMINATION WITHOUT ABNORMAL FINDINGS: ICD-10-CM

## 2021-05-20 PROCEDURE — 77067 SCR MAMMO BI INCL CAD: CPT | Mod: 26

## 2021-05-20 PROCEDURE — 77063 BREAST TOMOSYNTHESIS BI: CPT

## 2021-05-20 PROCEDURE — 77067 SCR MAMMO BI INCL CAD: CPT

## 2021-05-20 PROCEDURE — 77063 BREAST TOMOSYNTHESIS BI: CPT | Mod: 26

## 2022-12-12 ENCOUNTER — OUTPATIENT (OUTPATIENT)
Dept: OUTPATIENT SERVICES | Facility: HOSPITAL | Age: 77
LOS: 1 days | End: 2022-12-12
Payer: MEDICARE

## 2022-12-12 ENCOUNTER — APPOINTMENT (OUTPATIENT)
Dept: MAMMOGRAPHY | Facility: CLINIC | Age: 77
End: 2022-12-12

## 2022-12-12 DIAGNOSIS — Z00.8 ENCOUNTER FOR OTHER GENERAL EXAMINATION: ICD-10-CM

## 2022-12-12 DIAGNOSIS — Z98.1 ARTHRODESIS STATUS: Chronic | ICD-10-CM

## 2022-12-12 PROCEDURE — 77063 BREAST TOMOSYNTHESIS BI: CPT | Mod: 26

## 2022-12-12 PROCEDURE — 77067 SCR MAMMO BI INCL CAD: CPT

## 2022-12-12 PROCEDURE — 77063 BREAST TOMOSYNTHESIS BI: CPT

## 2022-12-12 PROCEDURE — 77067 SCR MAMMO BI INCL CAD: CPT | Mod: 26

## 2023-09-22 ENCOUNTER — OUTPATIENT (OUTPATIENT)
Dept: OUTPATIENT SERVICES | Facility: HOSPITAL | Age: 78
LOS: 1 days | End: 2023-09-22
Payer: MEDICARE

## 2023-09-22 VITALS
HEIGHT: 63 IN | HEART RATE: 72 BPM | DIASTOLIC BLOOD PRESSURE: 80 MMHG | RESPIRATION RATE: 18 BRPM | SYSTOLIC BLOOD PRESSURE: 167 MMHG | TEMPERATURE: 99 F | WEIGHT: 138.01 LBS | OXYGEN SATURATION: 99 %

## 2023-09-22 DIAGNOSIS — Z87.81 PERSONAL HISTORY OF (HEALED) TRAUMATIC FRACTURE: Chronic | ICD-10-CM

## 2023-09-22 DIAGNOSIS — Z90.89 ACQUIRED ABSENCE OF OTHER ORGANS: Chronic | ICD-10-CM

## 2023-09-22 DIAGNOSIS — Z98.891 HISTORY OF UTERINE SCAR FROM PREVIOUS SURGERY: Chronic | ICD-10-CM

## 2023-09-22 DIAGNOSIS — M17.10 UNILATERAL PRIMARY OSTEOARTHRITIS, UNSPECIFIED KNEE: ICD-10-CM

## 2023-09-22 DIAGNOSIS — Z01.818 ENCOUNTER FOR OTHER PREPROCEDURAL EXAMINATION: ICD-10-CM

## 2023-09-22 DIAGNOSIS — Z29.9 ENCOUNTER FOR PROPHYLACTIC MEASURES, UNSPECIFIED: ICD-10-CM

## 2023-09-22 DIAGNOSIS — M19.90 UNSPECIFIED OSTEOARTHRITIS, UNSPECIFIED SITE: ICD-10-CM

## 2023-09-22 DIAGNOSIS — Z98.1 ARTHRODESIS STATUS: Chronic | ICD-10-CM

## 2023-09-22 DIAGNOSIS — I10 ESSENTIAL (PRIMARY) HYPERTENSION: ICD-10-CM

## 2023-09-22 LAB
A1C WITH ESTIMATED AVERAGE GLUCOSE RESULT: 5.5 % — SIGNIFICANT CHANGE UP (ref 4–5.6)
ANION GAP SERPL CALC-SCNC: 13 MMOL/L — SIGNIFICANT CHANGE UP (ref 5–17)
BLD GP AB SCN SERPL QL: NEGATIVE — SIGNIFICANT CHANGE UP
BUN SERPL-MCNC: 16 MG/DL — SIGNIFICANT CHANGE UP (ref 7–23)
CALCIUM SERPL-MCNC: 10.4 MG/DL — SIGNIFICANT CHANGE UP (ref 8.4–10.5)
CHLORIDE SERPL-SCNC: 88 MMOL/L — LOW (ref 96–108)
CO2 SERPL-SCNC: 30 MMOL/L — SIGNIFICANT CHANGE UP (ref 22–31)
CREAT SERPL-MCNC: 0.65 MG/DL — SIGNIFICANT CHANGE UP (ref 0.5–1.3)
EGFR: 90 ML/MIN/1.73M2 — SIGNIFICANT CHANGE UP
ESTIMATED AVERAGE GLUCOSE: 111 MG/DL — SIGNIFICANT CHANGE UP (ref 68–114)
GLUCOSE SERPL-MCNC: 112 MG/DL — HIGH (ref 70–99)
HCT VFR BLD CALC: 38.1 % — SIGNIFICANT CHANGE UP (ref 34.5–45)
HGB BLD-MCNC: 13.4 G/DL — SIGNIFICANT CHANGE UP (ref 11.5–15.5)
MCHC RBC-ENTMCNC: 31.5 PG — SIGNIFICANT CHANGE UP (ref 27–34)
MCHC RBC-ENTMCNC: 35.2 GM/DL — SIGNIFICANT CHANGE UP (ref 32–36)
MCV RBC AUTO: 89.4 FL — SIGNIFICANT CHANGE UP (ref 80–100)
MRSA PCR RESULT.: SIGNIFICANT CHANGE UP
NRBC # BLD: 0 /100 WBCS — SIGNIFICANT CHANGE UP (ref 0–0)
PLATELET # BLD AUTO: 254 K/UL — SIGNIFICANT CHANGE UP (ref 150–400)
POTASSIUM SERPL-MCNC: 3.1 MMOL/L — LOW (ref 3.5–5.3)
POTASSIUM SERPL-SCNC: 3.1 MMOL/L — LOW (ref 3.5–5.3)
RBC # BLD: 4.26 M/UL — SIGNIFICANT CHANGE UP (ref 3.8–5.2)
RBC # FLD: 11.5 % — SIGNIFICANT CHANGE UP (ref 10.3–14.5)
RH IG SCN BLD-IMP: POSITIVE — SIGNIFICANT CHANGE UP
S AUREUS DNA NOSE QL NAA+PROBE: SIGNIFICANT CHANGE UP
SODIUM SERPL-SCNC: 131 MMOL/L — LOW (ref 135–145)
WBC # BLD: 7.73 K/UL — SIGNIFICANT CHANGE UP (ref 3.8–10.5)
WBC # FLD AUTO: 7.73 K/UL — SIGNIFICANT CHANGE UP (ref 3.8–10.5)

## 2023-09-22 PROCEDURE — G0463: CPT

## 2023-09-22 PROCEDURE — 86901 BLOOD TYPING SEROLOGIC RH(D): CPT

## 2023-09-22 PROCEDURE — 87641 MR-STAPH DNA AMP PROBE: CPT

## 2023-09-22 PROCEDURE — 87640 STAPH A DNA AMP PROBE: CPT

## 2023-09-22 PROCEDURE — 80048 BASIC METABOLIC PNL TOTAL CA: CPT

## 2023-09-22 PROCEDURE — 86850 RBC ANTIBODY SCREEN: CPT

## 2023-09-22 PROCEDURE — 85027 COMPLETE CBC AUTOMATED: CPT

## 2023-09-22 PROCEDURE — 36415 COLL VENOUS BLD VENIPUNCTURE: CPT

## 2023-09-22 PROCEDURE — 83036 HEMOGLOBIN GLYCOSYLATED A1C: CPT

## 2023-09-22 PROCEDURE — 86900 BLOOD TYPING SEROLOGIC ABO: CPT

## 2023-09-22 NOTE — H&P PST ADULT - OTHER CARE PROVIDERS
Dr. Landaverde- [facility] ([facility]), - Pain management- [place]

## 2023-09-22 NOTE — H&P PST ADULT - NSICDXPASTMEDICALHX_GEN_ALL_CORE_FT
PAST MEDICAL HISTORY:  Back pain     [place] (hard of hearing)     Hyperlipidemia     Hypertension

## 2023-09-22 NOTE — H&P PST ADULT - ASSESSMENT
DASI Score: 6.02  DASI Activity: able to go up one flight of stairs with cane. Light housewotrk  Loose or removable teeth: denies    CAPRINI SCORE    AGE RELATED RISK FACTORS                                                       MOBILITY RELATED FACTORS  [ ] Age 41-60 years                                            (1 Point)                  [ ] Bed rest                                                        (1 Point)  [ ] Age: 61-74 years                                           (2 Points)                [ ] Plaster cast                                                   (2 Points)  [x ] Age= 75 years                                              (3 Points)                 [ ] Bed bound for more than 72 hours                   (2 Points)    DISEASE RELATED RISK FACTORS                                               GENDER SPECIFIC FACTORS  [ ] Edema in the lower extremities                       (1 Point)                  [ ] Pregnancy                                                     (1 Point)  [ ] Varicose veins                                               (1 Point)                  [ ] Post-partum < 6 weeks                                   (1 Point)             [ ] BMI > 25 Kg/m2                                            (1 Point)                  [ ] Hormonal therapy  or oral contraception            (1 Point)                 [ ] Sepsis (in the previous month)                        (1 Point)                  [ ] History of pregnancy complications  [ ] Pneumonia or serious lung disease                                               [ ] Unexplained or recurrent                       (1 Point)           (in the previous month)                               (1 Point)  [ ] Abnormal pulmonary function test                     (1 Point)                 SURGERY RELATED RISK FACTORS  [ ] Acute myocardial infarction                              (1 Point)                 [ ]  Section                                            (1 Point)  [ ] Congestive heart failure (in the previous month)  (1 Point)                 [ ] Minor surgery                                                 (1 Point)   [ ] Inflammatory bowel disease                             (1 Point)                 [ ] Arthroscopic surgery                                        (2 Points)  [ ] Central venous access                                    (2 Points)                [ ] General surgery lasting more than 45 minutes   (2 Points)       [ ] Stroke (in the previous month)                          (5 Points)               [x ] Elective arthroplasty                                        (5 Points)                                                                                                                                               HEMATOLOGY RELATED FACTORS                                                 TRAUMA RELATED RISK FACTORS  [ ] Prior episodes of VTE                                     (3 Points)                 [ ] Fracture of the hip, pelvis, or leg                       (5 Points)  [ ] Positive family history for VTE                         (3 Points)                 [ ] Acute spinal cord injury (in the previous month)  (5 Points)  [ ] Prothrombin 28234 A                                      (3 Points)                 [ ] Paralysis  (less than 1 month)                          (5 Points)  [ ] Factor V Leiden                                             (3 Points)                 [ ] Multiple Trauma within 1 month                         (5 Points)  [ ] Lupus anticoagulants                                     (3 Points)                                                           [ ] Anticardiolipin antibodies                                (3 Points)                                                       [ ] High homocysteine in the blood                      (3 Points)                                             [ ] Other congenital or acquired thrombophilia       (3 Points)                                                [ ] Heparin induced thrombocytopenia                  (3 Points)                                          Total Score [   8  ]

## 2023-09-22 NOTE — H&P PST ADULT - HISTORY OF PRESENT ILLNESS
This is 77 year old female with past medical history of HTN, HLD  and prior lumbar spine surgery x 4 follows with pain management monthly. Reports having Right knee pain for the last two years, has tried conservative   measures with pain medications and Gel injections- lastly in May 2023 with associated bilateral lower extremity paresthesia. Wears immobilizer for support during ambulation and uses cane.   Presenting to PST for scheduled Right total knee replacement on 10/6/23 with Dr. De   This is 77 year old female with past medical history of HTN, HLD  and prior lumbar spine surgery x 4, follows with pain management monthly. Reports having Right knee pain for the last two years, has tried conservative measures with pain medications and Gel injections- lastly in May 2023 with associated bilateral lower extremity paresthesia. Wears immobilizer for support during ambulation and uses cane.   Presenting to PST for scheduled Right total knee replacement on 10/6/23 with Dr. De

## 2023-09-22 NOTE — H&P PST ADULT - ATTENDING COMMENTS
77 year old woman with end stage right knee OA here for right tkr.  She understands r/b/a which include infection, blood loss, nv damage, stiffness, dvt.

## 2023-09-22 NOTE — H&P PST ADULT - PROBLEM SELECTOR PLAN 1
Preop Preop instructions and chlorhexidene soap given  Labs CBC BMP A1c T&S MRSA performed in [facility]  ABO DOS

## 2023-09-22 NOTE — H&P PST ADULT - NSICDXPASTSURGICALHX_GEN_ALL_CORE_FT
PAST SURGICAL HISTORY:  H/O spinal fusion     History of  section     History of fracture of left ankle     History of tonsillectomy

## 2023-10-05 ENCOUNTER — TRANSCRIPTION ENCOUNTER (OUTPATIENT)
Age: 78
End: 2023-10-05

## 2023-10-05 PROBLEM — H91.90 UNSPECIFIED HEARING LOSS, UNSPECIFIED EAR: Chronic | Status: ACTIVE | Noted: 2023-09-22

## 2023-10-06 ENCOUNTER — TRANSCRIPTION ENCOUNTER (OUTPATIENT)
Age: 78
End: 2023-10-06

## 2023-10-06 ENCOUNTER — OUTPATIENT (OUTPATIENT)
Dept: INPATIENT UNIT | Facility: HOSPITAL | Age: 78
LOS: 1 days | End: 2023-10-06
Payer: MEDICARE

## 2023-10-06 VITALS
WEIGHT: 138.01 LBS | HEIGHT: 63 IN | SYSTOLIC BLOOD PRESSURE: 158 MMHG | HEART RATE: 71 BPM | RESPIRATION RATE: 17 BRPM | DIASTOLIC BLOOD PRESSURE: 87 MMHG | TEMPERATURE: 99 F | OXYGEN SATURATION: 99 %

## 2023-10-06 DIAGNOSIS — Z87.81 PERSONAL HISTORY OF (HEALED) TRAUMATIC FRACTURE: Chronic | ICD-10-CM

## 2023-10-06 DIAGNOSIS — M17.10 UNILATERAL PRIMARY OSTEOARTHRITIS, UNSPECIFIED KNEE: ICD-10-CM

## 2023-10-06 DIAGNOSIS — Z90.89 ACQUIRED ABSENCE OF OTHER ORGANS: Chronic | ICD-10-CM

## 2023-10-06 DIAGNOSIS — Z98.1 ARTHRODESIS STATUS: Chronic | ICD-10-CM

## 2023-10-06 DIAGNOSIS — Z98.891 HISTORY OF UTERINE SCAR FROM PREVIOUS SURGERY: Chronic | ICD-10-CM

## 2023-10-06 LAB
GLUCOSE BLDC GLUCOMTR-MCNC: 91 MG/DL — SIGNIFICANT CHANGE UP (ref 70–99)
RH IG SCN BLD-IMP: POSITIVE — SIGNIFICANT CHANGE UP

## 2023-10-06 DEVICE — PATELLA  ALL POLY VE 32MM: Type: IMPLANTABLE DEVICE | Site: RIGHT | Status: FUNCTIONAL

## 2023-10-06 DEVICE — SCREW HEX HEADED 3.5X48MM: Type: IMPLANTABLE DEVICE | Site: RIGHT | Status: FUNCTIONAL

## 2023-10-06 DEVICE — ZIMMER FEMALE HEX SCREW MAGNETIC 2.5MM X 25MM: Type: IMPLANTABLE DEVICE | Site: RIGHT | Status: FUNCTIONAL

## 2023-10-06 DEVICE — CEMENT SIMPLEX P 40GM: Type: IMPLANTABLE DEVICE | Site: RIGHT | Status: FUNCTIONAL

## 2023-10-06 DEVICE — TIB PSN NP STM 5 DEG SZ DR: Type: IMPLANTABLE DEVICE | Site: RIGHT | Status: FUNCTIONAL

## 2023-10-06 DEVICE — ZIMMER/NEXGEN SMOOTH PIN 3.2X75MM: Type: IMPLANTABLE DEVICE | Site: RIGHT | Status: FUNCTIONAL

## 2023-10-06 DEVICE — FEM PERSONA PS CMT CCR STD SZ7 R: Type: IMPLANTABLE DEVICE | Site: RIGHT | Status: FUNCTIONAL

## 2023-10-06 DEVICE — SURF ART PERSONA 6-9 CD RT 10MM: Type: IMPLANTABLE DEVICE | Site: RIGHT | Status: FUNCTIONAL

## 2023-10-06 RX ORDER — ACETAMINOPHEN 500 MG
1000 TABLET ORAL ONCE
Refills: 0 | Status: COMPLETED | OUTPATIENT
Start: 2023-10-07 | End: 2023-10-07

## 2023-10-06 RX ORDER — LIDOCAINE 4 G/100G
1 CREAM TOPICAL DAILY
Refills: 0 | Status: DISCONTINUED | OUTPATIENT
Start: 2023-10-06 | End: 2023-10-08

## 2023-10-06 RX ORDER — METOPROLOL TARTRATE 50 MG
50 TABLET ORAL DAILY
Refills: 0 | Status: DISCONTINUED | OUTPATIENT
Start: 2023-10-06 | End: 2023-10-06

## 2023-10-06 RX ORDER — CHLORTHALIDONE 50 MG
1 TABLET ORAL
Refills: 0 | DISCHARGE

## 2023-10-06 RX ORDER — PANTOPRAZOLE SODIUM 20 MG/1
40 TABLET, DELAYED RELEASE ORAL
Refills: 0 | Status: DISCONTINUED | OUTPATIENT
Start: 2023-10-06 | End: 2023-10-08

## 2023-10-06 RX ORDER — SODIUM CHLORIDE 9 MG/ML
3 INJECTION INTRAMUSCULAR; INTRAVENOUS; SUBCUTANEOUS EVERY 8 HOURS
Refills: 0 | Status: DISCONTINUED | OUTPATIENT
Start: 2023-10-06 | End: 2023-10-06

## 2023-10-06 RX ORDER — POLYETHYLENE GLYCOL 3350 17 G/17G
17 POWDER, FOR SOLUTION ORAL AT BEDTIME
Refills: 0 | Status: DISCONTINUED | OUTPATIENT
Start: 2023-10-06 | End: 2023-10-08

## 2023-10-06 RX ORDER — ONDANSETRON 8 MG/1
4 TABLET, FILM COATED ORAL ONCE
Refills: 0 | Status: DISCONTINUED | OUTPATIENT
Start: 2023-10-06 | End: 2023-10-06

## 2023-10-06 RX ORDER — KETOROLAC TROMETHAMINE 30 MG/ML
15 SYRINGE (ML) INJECTION EVERY 6 HOURS
Refills: 0 | Status: DISCONTINUED | OUTPATIENT
Start: 2023-10-06 | End: 2023-10-06

## 2023-10-06 RX ORDER — VALSARTAN 80 MG/1
40 TABLET ORAL DAILY
Refills: 0 | Status: DISCONTINUED | OUTPATIENT
Start: 2023-10-06 | End: 2023-10-08

## 2023-10-06 RX ORDER — OMEGA-3 ACID ETHYL ESTERS 1 G
1 CAPSULE ORAL
Refills: 0 | DISCHARGE

## 2023-10-06 RX ORDER — ONDANSETRON 8 MG/1
4 TABLET, FILM COATED ORAL EVERY 6 HOURS
Refills: 0 | Status: DISCONTINUED | OUTPATIENT
Start: 2023-10-06 | End: 2023-10-07

## 2023-10-06 RX ORDER — TRAMADOL HYDROCHLORIDE 50 MG/1
50 TABLET ORAL ONCE
Refills: 0 | Status: DISCONTINUED | OUTPATIENT
Start: 2023-10-06 | End: 2023-10-06

## 2023-10-06 RX ORDER — LIDOCAINE HCL 20 MG/ML
0.2 VIAL (ML) INJECTION ONCE
Refills: 0 | Status: DISCONTINUED | OUTPATIENT
Start: 2023-10-06 | End: 2023-10-06

## 2023-10-06 RX ORDER — SODIUM CHLORIDE 9 MG/ML
1000 INJECTION, SOLUTION INTRAVENOUS
Refills: 0 | Status: DISCONTINUED | OUTPATIENT
Start: 2023-10-06 | End: 2023-10-06

## 2023-10-06 RX ORDER — CEFAZOLIN SODIUM 1 G
2000 VIAL (EA) INJECTION ONCE
Refills: 0 | Status: COMPLETED | OUTPATIENT
Start: 2023-10-06 | End: 2023-10-06

## 2023-10-06 RX ORDER — PANTOPRAZOLE SODIUM 20 MG/1
40 TABLET, DELAYED RELEASE ORAL ONCE
Refills: 0 | Status: COMPLETED | OUTPATIENT
Start: 2023-10-06 | End: 2023-10-06

## 2023-10-06 RX ORDER — SODIUM CHLORIDE 9 MG/ML
500 INJECTION INTRAMUSCULAR; INTRAVENOUS; SUBCUTANEOUS ONCE
Refills: 0 | Status: COMPLETED | OUTPATIENT
Start: 2023-10-06 | End: 2023-10-06

## 2023-10-06 RX ORDER — ACETAMINOPHEN 500 MG
975 TABLET ORAL EVERY 8 HOURS
Refills: 0 | Status: DISCONTINUED | OUTPATIENT
Start: 2023-10-07 | End: 2023-10-08

## 2023-10-06 RX ORDER — CYCLOBENZAPRINE HYDROCHLORIDE 10 MG/1
10 TABLET, FILM COATED ORAL AT BEDTIME
Refills: 0 | Status: DISCONTINUED | OUTPATIENT
Start: 2023-10-06 | End: 2023-10-08

## 2023-10-06 RX ORDER — CEFAZOLIN SODIUM 1 G
2000 VIAL (EA) INJECTION EVERY 8 HOURS
Refills: 0 | Status: COMPLETED | OUTPATIENT
Start: 2023-10-06 | End: 2023-10-07

## 2023-10-06 RX ORDER — CEFAZOLIN SODIUM 1 G
2000 VIAL (EA) INJECTION EVERY 8 HOURS
Refills: 0 | Status: DISCONTINUED | OUTPATIENT
Start: 2023-10-06 | End: 2023-10-06

## 2023-10-06 RX ORDER — SODIUM CHLORIDE 9 MG/ML
500 INJECTION INTRAMUSCULAR; INTRAVENOUS; SUBCUTANEOUS ONCE
Refills: 0 | Status: COMPLETED | OUTPATIENT
Start: 2023-10-06 | End: 2023-10-07

## 2023-10-06 RX ORDER — DEXAMETHASONE 0.5 MG/5ML
8 ELIXIR ORAL ONCE
Refills: 0 | Status: COMPLETED | OUTPATIENT
Start: 2023-10-07 | End: 2023-10-07

## 2023-10-06 RX ORDER — HYDROMORPHONE HYDROCHLORIDE 2 MG/ML
0.5 INJECTION INTRAMUSCULAR; INTRAVENOUS; SUBCUTANEOUS
Refills: 0 | Status: DISCONTINUED | OUTPATIENT
Start: 2023-10-06 | End: 2023-10-06

## 2023-10-06 RX ORDER — MAGNESIUM HYDROXIDE 400 MG/1
30 TABLET, CHEWABLE ORAL DAILY
Refills: 0 | Status: DISCONTINUED | OUTPATIENT
Start: 2023-10-06 | End: 2023-10-08

## 2023-10-06 RX ORDER — ACETAMINOPHEN 500 MG
1000 TABLET ORAL ONCE
Refills: 0 | Status: COMPLETED | OUTPATIENT
Start: 2023-10-06 | End: 2023-10-06

## 2023-10-06 RX ORDER — HYDROMORPHONE HYDROCHLORIDE 2 MG/ML
0.5 INJECTION INTRAMUSCULAR; INTRAVENOUS; SUBCUTANEOUS
Refills: 0 | Status: DISCONTINUED | OUTPATIENT
Start: 2023-10-06 | End: 2023-10-07

## 2023-10-06 RX ORDER — ACETAMINOPHEN 500 MG
975 TABLET ORAL EVERY 8 HOURS
Refills: 0 | Status: DISCONTINUED | OUTPATIENT
Start: 2023-10-06 | End: 2023-10-06

## 2023-10-06 RX ORDER — ATORVASTATIN CALCIUM 80 MG/1
1 TABLET, FILM COATED ORAL
Refills: 0 | DISCHARGE

## 2023-10-06 RX ORDER — ONDANSETRON 8 MG/1
4 TABLET, FILM COATED ORAL EVERY 6 HOURS
Refills: 0 | Status: DISCONTINUED | OUTPATIENT
Start: 2023-10-06 | End: 2023-10-06

## 2023-10-06 RX ORDER — NALOXONE HYDROCHLORIDE 4 MG/.1ML
0.1 SPRAY NASAL
Refills: 0 | Status: DISCONTINUED | OUTPATIENT
Start: 2023-10-06 | End: 2023-10-07

## 2023-10-06 RX ORDER — METOPROLOL TARTRATE 50 MG
50 TABLET ORAL DAILY
Refills: 0 | Status: DISCONTINUED | OUTPATIENT
Start: 2023-10-06 | End: 2023-10-08

## 2023-10-06 RX ORDER — SODIUM CHLORIDE 9 MG/ML
1000 INJECTION INTRAMUSCULAR; INTRAVENOUS; SUBCUTANEOUS
Refills: 0 | Status: DISCONTINUED | OUTPATIENT
Start: 2023-10-06 | End: 2023-10-07

## 2023-10-06 RX ORDER — HYDROMORPHONE HYDROCHLORIDE 2 MG/ML
30 INJECTION INTRAMUSCULAR; INTRAVENOUS; SUBCUTANEOUS
Refills: 0 | Status: DISCONTINUED | OUTPATIENT
Start: 2023-10-06 | End: 2023-10-07

## 2023-10-06 RX ORDER — ATORVASTATIN CALCIUM 80 MG/1
10 TABLET, FILM COATED ORAL AT BEDTIME
Refills: 0 | Status: DISCONTINUED | OUTPATIENT
Start: 2023-10-06 | End: 2023-10-08

## 2023-10-06 RX ORDER — ASPIRIN/CALCIUM CARB/MAGNESIUM 324 MG
81 TABLET ORAL
Refills: 0 | Status: DISCONTINUED | OUTPATIENT
Start: 2023-10-06 | End: 2023-10-08

## 2023-10-06 RX ORDER — SENNA PLUS 8.6 MG/1
2 TABLET ORAL AT BEDTIME
Refills: 0 | Status: DISCONTINUED | OUTPATIENT
Start: 2023-10-06 | End: 2023-10-08

## 2023-10-06 RX ADMIN — HYDROMORPHONE HYDROCHLORIDE 30 MILLILITER(S): 2 INJECTION INTRAMUSCULAR; INTRAVENOUS; SUBCUTANEOUS at 13:23

## 2023-10-06 RX ADMIN — SODIUM CHLORIDE 500 MILLILITER(S): 9 INJECTION INTRAMUSCULAR; INTRAVENOUS; SUBCUTANEOUS at 13:27

## 2023-10-06 RX ADMIN — HYDROMORPHONE HYDROCHLORIDE 30 MILLILITER(S): 2 INJECTION INTRAMUSCULAR; INTRAVENOUS; SUBCUTANEOUS at 10:32

## 2023-10-06 RX ADMIN — HYDROMORPHONE HYDROCHLORIDE 30 MILLILITER(S): 2 INJECTION INTRAMUSCULAR; INTRAVENOUS; SUBCUTANEOUS at 13:00

## 2023-10-06 RX ADMIN — ATORVASTATIN CALCIUM 10 MILLIGRAM(S): 80 TABLET, FILM COATED ORAL at 21:34

## 2023-10-06 RX ADMIN — HYDROMORPHONE HYDROCHLORIDE 0.5 MILLIGRAM(S): 2 INJECTION INTRAMUSCULAR; INTRAVENOUS; SUBCUTANEOUS at 10:10

## 2023-10-06 RX ADMIN — HYDROMORPHONE HYDROCHLORIDE 0.5 MILLIGRAM(S): 2 INJECTION INTRAMUSCULAR; INTRAVENOUS; SUBCUTANEOUS at 10:30

## 2023-10-06 RX ADMIN — SODIUM CHLORIDE 50 MILLILITER(S): 9 INJECTION INTRAMUSCULAR; INTRAVENOUS; SUBCUTANEOUS at 18:27

## 2023-10-06 RX ADMIN — SODIUM CHLORIDE 75 MILLILITER(S): 9 INJECTION, SOLUTION INTRAVENOUS at 10:01

## 2023-10-06 RX ADMIN — HYDROMORPHONE HYDROCHLORIDE 0.5 MILLIGRAM(S): 2 INJECTION INTRAMUSCULAR; INTRAVENOUS; SUBCUTANEOUS at 10:01

## 2023-10-06 RX ADMIN — TRAMADOL HYDROCHLORIDE 50 MILLIGRAM(S): 50 TABLET ORAL at 07:10

## 2023-10-06 RX ADMIN — SODIUM CHLORIDE 500 MILLILITER(S): 9 INJECTION INTRAMUSCULAR; INTRAVENOUS; SUBCUTANEOUS at 12:03

## 2023-10-06 RX ADMIN — HYDROMORPHONE HYDROCHLORIDE 30 MILLILITER(S): 2 INJECTION INTRAMUSCULAR; INTRAVENOUS; SUBCUTANEOUS at 19:13

## 2023-10-06 RX ADMIN — PANTOPRAZOLE SODIUM 40 MILLIGRAM(S): 20 TABLET, DELAYED RELEASE ORAL at 07:10

## 2023-10-06 RX ADMIN — Medication 100 MILLIGRAM(S): at 16:27

## 2023-10-06 RX ADMIN — CYCLOBENZAPRINE HYDROCHLORIDE 10 MILLIGRAM(S): 10 TABLET, FILM COATED ORAL at 21:34

## 2023-10-06 RX ADMIN — POLYETHYLENE GLYCOL 3350 17 GRAM(S): 17 POWDER, FOR SOLUTION ORAL at 21:35

## 2023-10-06 RX ADMIN — ONDANSETRON 4 MILLIGRAM(S): 8 TABLET, FILM COATED ORAL at 21:35

## 2023-10-06 RX ADMIN — Medication 1000 MILLIGRAM(S): at 16:16

## 2023-10-06 RX ADMIN — Medication 15 MILLIGRAM(S): at 12:03

## 2023-10-06 RX ADMIN — Medication 81 MILLIGRAM(S): at 17:14

## 2023-10-06 RX ADMIN — Medication 400 MILLIGRAM(S): at 15:59

## 2023-10-06 NOTE — OCCUPATIONAL THERAPY INITIAL EVALUATION ADULT - PERTINENT HX OF CURRENT PROBLEM, REHAB EVAL
77 year old female with past medical history of HTN, HLD  and prior lumbar spine surgery x 4, follows with pain management monthly. Reports having Right knee pain for the last two years, has tried conservative measures with pain medications and Gel injections- lastly in May 2023 with associated bilateral lower extremity paresthesia. Wears immobilizer for support during ambulation and uses cane.   Presenting to PST for scheduled Right total knee replacement on 10/6/23 with Dr. De

## 2023-10-06 NOTE — DISCHARGE NOTE PROVIDER - NSDCMRMEDTOKEN_GEN_ALL_CORE_FT
atorvastatin 10 mg oral tablet: 1 tab(s) orally once a day (at bedtime)  metoprolol succinate 50 mg oral tablet, extended release: 1 tab(s) orally once a day (at bedtime)  morphine 15 mg oral capsule: 15 milligram(s) orally every 4 hours  Omega Essentials 667 mg oral capsule: 1 cap(s) orally once a day  valsartan 40 mg oral tablet: 1 tab(s) orally once a day   acetaminophen 325 mg oral tablet: 3 tab(s) orally every 8 hours Continue for 6 days, then as needed  aspirin 81 mg oral delayed release tablet: 1 tab(s) orally 2 times a day  atorvastatin 10 mg oral tablet: 1 tab(s) orally once a day (at bedtime)  metoprolol succinate 50 mg oral tablet, extended release: 1 tab(s) orally once a day (at bedtime)  metoprolol succinate 50 mg oral tablet, extended release: 1 tab(s) orally once a day  morphine 15 mg oral capsule: 15 milligram(s) orally every 4 hours  morphine 15 mg oral tablet: 1 tab(s) orally every 4 hours as needed for Moderate Pain (4 - 6) or 2 tabs orally every 4 hours as needed for severe pain (7-10)  Omega Essentials 667 mg oral capsule: 1 cap(s) orally once a day  pantoprazole 40 mg oral delayed release tablet: 1 tab(s) orally once a day (before a meal)  polyethylene glycol 3350 oral powder for reconstitution: 17 gram(s) orally once a day (at bedtime)  senna leaf extract oral tablet: 2 tab(s) orally once a day (at bedtime)  valsartan 40 mg oral tablet: 1 tab(s) orally once a day  valsartan 40 mg oral tablet: 1 tab(s) orally once a day   acetaminophen 325 mg oral tablet: 3 tab(s) orally every 8 hours Continue for 6 days, then as needed  aspirin 81 mg oral delayed release tablet: 1 tab(s) orally 2 times a day MDD: 2  atorvastatin 10 mg oral tablet: 1 tab(s) orally once a day (at bedtime)  metoprolol succinate 50 mg oral tablet, extended release: 1 tab(s) orally once a day  morphine 15 mg oral tablet: 1 tab(s) orally every 4 hours as needed for Moderate Pain (4 - 6) or 2 tabs orally every 4 hours as needed for severe pain (7-10)  Narcan 4 mg/0.1 mL nasal spray: 1 spray(s) intranasally every 2 minutes Alternate nostrils  Omega Essentials 667 mg oral capsule: 1 cap(s) orally once a day  pantoprazole 40 mg oral delayed release tablet: 1 tab(s) orally once a day (before a meal) MDD: 1  polyethylene glycol 3350 oral powder for reconstitution: 17 gram(s) orally once a day (at bedtime) as needed for  constipation  senna leaf extract oral tablet: 2 tab(s) orally once a day (at bedtime)  valsartan 40 mg oral tablet: 1 tab(s) orally once a day

## 2023-10-06 NOTE — CONSULT NOTE ADULT - SUBJECTIVE AND OBJECTIVE BOX
Chief Complaint:  Patient is a 77y old  Female who presents with a chief complaint of right knee pain    HPI:  77F PMHx HTN, HLD, prior lumbar spine surgery x 4 (fusions), chronic right knee pain/OA has tried conservative measures with pain medications and Gel injections- lastly in May 2023      Admitted for scheduled right TKR 10/6/23    Current out- patient pain regimen: Has been on long term Morphine 15 mg Q4 hrs PRN, was increased to 30 mg Q4 hrs PRN this past month 2/2 worsening pain, with good effect. Pain MD believes she will need MS IR 30 mg Q4 hrs PRN post-op and pt has already filled the Rx on 10/3 x 2 wks in anticipation.    Out Patient Pain Management provider: Dr. Franco CHATMAN Prescription Monitoring Program: Reference #[id number]    Opioid Risk Tool (ORT-OUD) Score: Low    Pain Score: 3/10    Pt seen just finishing w/ PT/OT, appears comfortable, reports good effect w/ PCA Dilaudid. Very concerned w/ pain control after discharge, lengthy discussion re: regimen and plan. Pt reports taking Flexeril 10 mg QHS and Lidocaine patch, requests discontinuation of NSAIDs 2/2 had some GI issues in the past and was told to avoid.    REVIEW OF SYSTEMS:  CONSTITUTIONAL: No fever, weight loss, fatigue, falls  NEURO: No headaches, memory loss, loss of strength, tremors, dizziness or blurred vision  RESP: No shortness of breath, cough  : No urinary incontinence/retention  MSK: No joint pain or swelling; No upper or lower motor strength weakness  SKIN: No itching, burning, rashes, or lesions   PSYCHIATRIC: No depression, anxiety, mood swings, or difficulty sleeping      PHYSICAL EXAM  GENERAL: Seen at bedside, NAD, well-groomed, well-developed, appears stated age, no signs of toxicity  NEURO: Alert & Oriented X3, Good concentration; Follows commands; SILT   HEENT: Head atraumatic, normocephalic; speech clear and fluent  GI: Appetite good, last BM 2 days ago  : Voiding   EXTREMITIES: moving all extremities  MSK: Motor Strength 5/5 B/L upper and lower extremities; Wears immobilizer for support during ambulation and uses cane.   SKIN: No rashes or lesions  PSYCH: affect normal; good eye contact; no signs of depression or anxiety    PAST MEDICAL & SURGICAL HISTORY:  Hypertension      Hyperlipidemia      Back pain      [place] (hard of hearing)      H/O spinal fusion      History of fracture of left ankle      History of  section      History of tonsillectomy          FAMILY HISTORY:  No pertinent family history in first degree relatives        Allergies    sulfa drugs (Unknown)        MEDICATIONS  (STANDING):  acetaminophen   IVPB .. 1000 milliGRAM(s) IV Intermittent once  aspirin enteric coated 81 milliGRAM(s) Oral two times a day  atorvastatin 10 milliGRAM(s) Oral at bedtime  ceFAZolin   IVPB 2000 milliGRAM(s) IV Intermittent every 8 hours  HYDROmorphone PCA (1 mG/mL) 30 milliLiter(s) PCA Continuous PCA Continuous  metoprolol succinate ER 50 milliGRAM(s) Oral daily  naproxen 500 milliGRAM(s) Oral every 12 hours  pantoprazole    Tablet 40 milliGRAM(s) Oral before breakfast  polyethylene glycol 3350 17 Gram(s) Oral at bedtime  senna 2 Tablet(s) Oral at bedtime  sodium chloride 0.9% Bolus 500 milliLiter(s) IV Bolus once  sodium chloride 0.9%. 1000 milliLiter(s) (50 mL/Hr) IV Continuous <Continuous>  valsartan 40 milliGRAM(s) Oral daily    MEDICATIONS  (PRN):  HYDROmorphone PCA (1 mG/mL) Rescue Clinician Bolus 0.5 milliGRAM(s) IV Push every 15 minutes PRN for Pain Scale GREATER THAN 6  magnesium hydroxide Suspension 30 milliLiter(s) Oral daily PRN Constipation  naloxone Injectable 0.1 milliGRAM(s) IV Push every 3 minutes PRN For ANY of the following changes in patient status:  A. RR LESS THAN 10 breaths per minute, B. Oxygen saturation LESS THAN 90%, C. Sedation score of 6  ondansetron Injectable 4 milliGRAM(s) IV Push every 6 hours PRN Nausea      Vital Signs:  T(C): 36.8 (10-06-23 @ 14:30)  HR: 90 (10-06-23 @ 14:30)  BP: 129/77 (10-06-23 @ 14:30)  RR: 18 (10-06-23 @ 14:30)  SpO2: 98% (10-06-23 @ 14:30)    Pertinent labs/radiology:  Reviewed

## 2023-10-06 NOTE — PHYSICAL THERAPY INITIAL EVALUATION ADULT - ADDITIONAL COMMENTS
Pt states that she lives with her  in a pvt house with +3 AMBROCIO and no steps once inside. Pt reports that she was ambulating with a straight cane prior to admission. Pt states that she was independent with ADLs prior to admission.

## 2023-10-06 NOTE — DISCHARGE NOTE PROVIDER - NSDCFUADDINST_GEN_ALL_CORE_FT
Please call Dr. De' s office within next few days to schedule a follow up appointment about 4 weeks after surgery.   Dressing should be removed postop day 7.   Out of bed, ambulate, weight bearing as tolerated-   Physical therapy to assist with exercise and help increase endurance.   Please contact Doctors office regarding arrangements for out patient Physical therapy.   Please call Dr. De' s office within next few days to schedule a follow up appointment about 4 weeks after surgery.   Recommend follow up with medical MD, within next 4 weeks.   Dressing should be removed postop day 7.   Patient may shower, limit direct water to dressing, if wet pat dry.   Out of bed, ambulate, weight bearing as tolerated-   Physical therapy to assist with exercise and help increase endurance.   Please contact Doctors office regarding arrangements for out patient Physical therapy.

## 2023-10-06 NOTE — PRE-ANESTHESIA EVALUATION ADULT - LAST CARDIAC ANGIOGRAM/IMAGING
Feb 2023 Reports having Cardiac CT Score= 0 as per pt
Feb 2023 Reports having Cardiac CT Score= 0 as per pt

## 2023-10-06 NOTE — DISCHARGE NOTE PROVIDER - HOSPITAL COURSE
History of Present Illness	  This is 77 year old female with past medical history of HTN, HLD  and prior lumbar spine surgery x 4, follows with pain management monthly. Reports having Right knee pain for the last two years, has tried conservative measures with pain medications and Gel injections- lastly in May 2023 with associated bilateral lower extremity paresthesia. Wears immobilizer for support during ambulation and uses cane.   Presenting to [facility] for scheduled Right total knee replacement with Dr. De    GOC: To have no more pain    Allergies:   sulfa drugs: Drug Category, Unknown    Past Medical History:  Back pain   [place] (hard of hearing)   Hyperlipidemia   Hypertension.     Past Surgical History:  spinal fusion    section   fracture of left ankle   tonsillectomy    Hospital Course:  After admission on 10/6/2023 and receiving pre-operative parenteral prophylactic antibiotics, the patient underwent an uncomplicated Right total knee replacement with Dr. De. Patient tolerated the procedure well and was transferred to the recovery room in stable condition, with a stable neuro / vascular exam of the operated extremity.    Patient was placed on Ecotrin 81mg twice daily for DVT ppx, and was placed on Protonix for GI protection.   Patient was made weight bearing as tolerated with the operative leg.     Patient was placed on a PCA pump postoperatively due history of chronic pain.  Chronic pain was consulted and recommendations were followed.     Typical Physical & occupational therapy modalities post surgery were performed by physical and occupational therapies, including ambulation training, range of motion, ADL's, abd transfers.  After progression of mobility guided by the PT/ OT staff,  the patient was felt to benefit from further rehabilitative care for restoration to level of function. This was felt to best be accomplished at home with home PT.  Discharge and Orthopedic Care instructions were delineated in the Discharge Plan and reviewed with the patient. All medications were delineated in the medication reconciliation tool and key points were reviewed with the patient. They were deemed stable from an Orthopedic & medical standpoint for discharge.    IStop: This report was requested by: Hernando Alvarenga | Reference #: [id number] History of Present Illness	  This is 77 year old female with past medical history of HTN, HLD  and prior lumbar spine surgery x 4, follows with pain management monthly. Reports having Right knee pain for the last two years, has tried conservative measures with pain medications and Gel injections- lastly in May 2023 with associated bilateral lower extremity paresthesia. Wears immobilizer for support during ambulation and uses cane.   Presenting to [facility] for scheduled Right total knee replacement with Dr. De    GOC: To have no more pain    Allergies:   sulfa drugs: Drug Category, Unknown    Past Medical History:  Back pain   [place] (hard of hearing)   Hyperlipidemia   Hypertension.     Past Surgical History:  spinal fusion    section   fracture of left ankle   tonsillectomy    Hospital Course:  After admission on 10/6/2023 and receiving pre-operative parenteral prophylactic antibiotics, the patient underwent an uncomplicated Right total knee replacement with Dr. De. Patient tolerated the procedure well and was transferred to the recovery room in stable condition, with a stable neuro / vascular exam of the operated extremity.    Patient was placed on Ecotrin 81mg twice daily for DVT ppx, and was placed on Protonix for GI protection.   Patient was made weight bearing as tolerated with the operative leg.     Patient was placed on a PCA pump postoperatively due history of chronic pain.  Chronic pain was consulted and recommendations were followed.     Typical Physical & occupational therapy modalities post surgery were performed by physical and occupational therapies, including ambulation training, range of motion, ADL's, abd transfers.  After progression of mobility guided by the PT/ OT staff,  the patient was felt to benefit from further rehabilitative care for restoration to level of function. This was felt to best be accomplished at home with home PT.  Discharge and Orthopedic Care instructions were delineated in the Discharge Plan and reviewed with the patient.   All medications were delineated in the medication reconciliation tool and key points were reviewed with the patient.   They were deemed stable from an Orthopedic & medical standpoint for discharge 10/8/23.    IStop: This report was requested by: Hernando Alvarenga | Reference #: [id number]

## 2023-10-06 NOTE — PHYSICAL THERAPY INITIAL EVALUATION ADULT - PERTINENT HX OF CURRENT PROBLEM, REHAB EVAL
This is 77 year old female with past medical history of HTN, HLD  and prior lumbar spine surgery x 4, follows with pain management monthly. Reports having Right knee pain for the last two years, has tried conservative measures with pain medications and Gel injections- lastly in May 2023 with associated bilateral lower extremity paresthesia. Wears immobilizer for support during ambulation and uses cane.   Presenting to PST for scheduled Right total knee replacement on 10/6/23 with Dr. De

## 2023-10-06 NOTE — OCCUPATIONAL THERAPY INITIAL EVALUATION ADULT - IMPAIRMENTS CONTRIBUTING IMPAIRED BED MOBILITY, REHAB EVAL
9/28/2021              [address] [address]         Dear Saray Tony,    It has come to my attention that you are due for a follow-up appointment with me.   As you know, regular medical attention is essential to [facility] impaired balance/decreased strength

## 2023-10-06 NOTE — PHYSICAL THERAPY INITIAL EVALUATION ADULT - NSPTDMEREC_GEN_A_CORE
Pt has straight cane, pt would benefit from use of rolling walker for ambulation 2/2 unsafe and unsteady ambulation without assistive device./rolling walker

## 2023-10-06 NOTE — PRE-ANESTHESIA EVALUATION ADULT - NSANTHOSAYNRD_GEN_A_CORE
No. TRAVIS screening performed.  STOP BANG Legend: 0-2 = LOW Risk; 3-4 = INTERMEDIATE Risk; 5-8 = HIGH Risk
No. TRAVIS screening performed.  STOP BANG Legend: 0-2 = LOW Risk; 3-4 = INTERMEDIATE Risk; 5-8 = HIGH Risk

## 2023-10-06 NOTE — DISCHARGE NOTE PROVIDER - CARE PROVIDER_API CALL
Julito De  Orthopaedic Surgery  [address] [place] [address], [address]  [place] [address]  Phone: [phone]  Fax: [phone]  Follow Up Time: 2 weeks

## 2023-10-06 NOTE — CONSULT NOTE ADULT - ASSESSMENT
77F PMHx HTN, HLD, prior lumbar spine surgery x 4 (fusions), chronic right knee pain/OA has tried conservative measures with pain medications and Gel injections- lastly in May 2023      Admitted for scheduled right TKR 10/6/23    Current out- patient pain regimen: Has been on long term Morphine 15 mg Q4 hrs PRN, was increased to 30 mg Q4 hrs PRN this past month 2/2 worsening pain, with good effect. Pain MD believes she will need MS IR 30 mg Q4 hrs PRN post-op and pt has already filled the Rx on 10/3 x 2 wks in anticipation.  Out Patient Pain Management provider: Dr. Gamez    Pt reports good effect w/ PCA Dilaudid. Very concerned w/ pain control after discharge, lengthy discussion re: regimen and plan. Pt reports taking Flexeril 10 mg QHS and Lidocaine patch, requests discontinuation of NSAIDs 2/2 had some GI issues in the past and was told to avoid.    Plan discussed with primary team:  Continue Tylenol around the clock  Start Lidocaine patch daily PRN  Start Flexeril 10 mg QHS PRN spasm  When ready to discontinue PCA start PO Morphine IR 15 mg every 4 hrs PRN moderate pain and 30 mg every 4 hrs PRN severe pain (Calculated CrCl = 69)  Discontinue Naprosyn    Warm/cool packs for comfort, ice to knee per primary team  Bowel Regimen   Incentive Spirometer  PT per primary team  Monitor for sedation, respiratory depression  Follow up with  Franco for continued pain management after discharge  Narcan Rescue Kit on discharge (Naloxone 4 mg/0.1 ml nasal spray - 1 spray q 2-3 minutes alternating between nostrils)    Signing off, reconsult as needed    Time spent on encounter:   40     Minutes    Chronic Pain Service  [phone]

## 2023-10-06 NOTE — CHART NOTE - NSCHARTNOTEFT_GEN_A_CORE
Post-Operative Check    Pt evaluated at bedside resting without complaints. Pt states pain is well tolerated. No Chest Pain, SOB, N/V.    Vitals:  T(C): 37.1 (10-06-23 @ 13:30), Max: 37.1 (10-06-23 @ 06:13)  HR: 82 (10-06-23 @ 13:30) (69 - 86)  BP: 123/73 (10-06-23 @ 13:30) (120/58 - 158/87)  RR: 18 (10-06-23 @ 13:30) (15 - 18)  SpO2: 97% (10-06-23 @ 13:30) (92% - 100%)    Exam:  Alert and Oriented, No Acute Distress. VSS.   Laterality: RLE     Ace bandage is clean, dry and intact.      (+) PF/DF/EHL/FHL 5/5     Sensation intact to light touch      2+ DP/PT pulse  Calves soft, non-tender bilaterally    Xray: < from: Xray Knee 1 or 2 Views, Right (10.06.23 @ 09:23) >  IMPRESSION:     Unconstrained right total knee prosthesis implanted.     Intact and aligned hardware and no periprosthetic fractures.     Postoperative soft tissue changes.     Correlate with intraoperative findings.    A/P: 77y Female s/p Right Total Knee Arthroplasty. VSS. NAD  -PT/OT: WBAT/OOB  -IS bedside  -Ice/elevation   -DVT PPx: Aspirin 81mg BID, SCD, Early OOB and Amb  -GI PPx: Protonix 40mg   -Pain Control     -c/w PCA  -Continue post-op abx x 24hrs  -f/u AM labs.   -Dispo planning: pending PT/OT eval.     Hernando Alvarenga PA-C  Orthopedic Surgery Team  Team Pager #[phone]

## 2023-10-07 LAB
ANION GAP SERPL CALC-SCNC: 13 MMOL/L — SIGNIFICANT CHANGE UP (ref 5–17)
BUN SERPL-MCNC: 12 MG/DL — SIGNIFICANT CHANGE UP (ref 7–23)
CALCIUM SERPL-MCNC: 9 MG/DL — SIGNIFICANT CHANGE UP (ref 8.4–10.5)
CHLORIDE SERPL-SCNC: 100 MMOL/L — SIGNIFICANT CHANGE UP (ref 96–108)
CO2 SERPL-SCNC: 24 MMOL/L — SIGNIFICANT CHANGE UP (ref 22–31)
CREAT SERPL-MCNC: 0.8 MG/DL — SIGNIFICANT CHANGE UP (ref 0.5–1.3)
EGFR: 75 ML/MIN/1.73M2 — SIGNIFICANT CHANGE UP
GLUCOSE SERPL-MCNC: 108 MG/DL — HIGH (ref 70–99)
HCT VFR BLD CALC: 29.3 % — LOW (ref 34.5–45)
HGB BLD-MCNC: 9.9 G/DL — LOW (ref 11.5–15.5)
MCHC RBC-ENTMCNC: 31.5 PG — SIGNIFICANT CHANGE UP (ref 27–34)
MCHC RBC-ENTMCNC: 33.8 GM/DL — SIGNIFICANT CHANGE UP (ref 32–36)
MCV RBC AUTO: 93.3 FL — SIGNIFICANT CHANGE UP (ref 80–100)
NRBC # BLD: 0 /100 WBCS — SIGNIFICANT CHANGE UP (ref 0–0)
PLATELET # BLD AUTO: 196 K/UL — SIGNIFICANT CHANGE UP (ref 150–400)
POTASSIUM SERPL-MCNC: 4.2 MMOL/L — SIGNIFICANT CHANGE UP (ref 3.5–5.3)
POTASSIUM SERPL-SCNC: 4.2 MMOL/L — SIGNIFICANT CHANGE UP (ref 3.5–5.3)
RBC # BLD: 3.14 M/UL — LOW (ref 3.8–5.2)
RBC # FLD: 11.8 % — SIGNIFICANT CHANGE UP (ref 10.3–14.5)
SODIUM SERPL-SCNC: 137 MMOL/L — SIGNIFICANT CHANGE UP (ref 135–145)
WBC # BLD: 12.67 K/UL — HIGH (ref 3.8–10.5)
WBC # FLD AUTO: 12.67 K/UL — HIGH (ref 3.8–10.5)

## 2023-10-07 RX ORDER — ONDANSETRON 8 MG/1
4 TABLET, FILM COATED ORAL EVERY 6 HOURS
Refills: 0 | Status: DISCONTINUED | OUTPATIENT
Start: 2023-10-07 | End: 2023-10-08

## 2023-10-07 RX ORDER — SODIUM CHLORIDE 9 MG/ML
500 INJECTION INTRAMUSCULAR; INTRAVENOUS; SUBCUTANEOUS ONCE
Refills: 0 | Status: COMPLETED | OUTPATIENT
Start: 2023-10-07 | End: 2023-10-07

## 2023-10-07 RX ORDER — FAMOTIDINE 10 MG/ML
20 INJECTION INTRAVENOUS ONCE
Refills: 0 | Status: COMPLETED | OUTPATIENT
Start: 2023-10-07 | End: 2023-10-07

## 2023-10-07 RX ORDER — MORPHINE SULFATE 50 MG/1
15 CAPSULE, EXTENDED RELEASE ORAL EVERY 4 HOURS
Refills: 0 | Status: DISCONTINUED | OUTPATIENT
Start: 2023-10-07 | End: 2023-10-08

## 2023-10-07 RX ORDER — METOCLOPRAMIDE HCL 10 MG
5 TABLET ORAL EVERY 8 HOURS
Refills: 0 | Status: COMPLETED | OUTPATIENT
Start: 2023-10-07 | End: 2023-10-08

## 2023-10-07 RX ORDER — DIPHENHYDRAMINE HCL 50 MG
12.5 CAPSULE ORAL ONCE
Refills: 0 | Status: COMPLETED | OUTPATIENT
Start: 2023-10-07 | End: 2023-10-07

## 2023-10-07 RX ORDER — MORPHINE SULFATE 50 MG/1
30 CAPSULE, EXTENDED RELEASE ORAL EVERY 4 HOURS
Refills: 0 | Status: DISCONTINUED | OUTPATIENT
Start: 2023-10-07 | End: 2023-10-08

## 2023-10-07 RX ADMIN — SODIUM CHLORIDE 500 MILLILITER(S): 9 INJECTION INTRAMUSCULAR; INTRAVENOUS; SUBCUTANEOUS at 05:27

## 2023-10-07 RX ADMIN — MORPHINE SULFATE 30 MILLIGRAM(S): 50 CAPSULE, EXTENDED RELEASE ORAL at 15:24

## 2023-10-07 RX ADMIN — Medication 100 MILLIGRAM(S): at 00:38

## 2023-10-07 RX ADMIN — FAMOTIDINE 20 MILLIGRAM(S): 10 INJECTION INTRAVENOUS at 08:59

## 2023-10-07 RX ADMIN — Medication 81 MILLIGRAM(S): at 16:41

## 2023-10-07 RX ADMIN — Medication 81 MILLIGRAM(S): at 05:26

## 2023-10-07 RX ADMIN — ONDANSETRON 4 MILLIGRAM(S): 8 TABLET, FILM COATED ORAL at 15:24

## 2023-10-07 RX ADMIN — Medication 975 MILLIGRAM(S): at 12:53

## 2023-10-07 RX ADMIN — CYCLOBENZAPRINE HYDROCHLORIDE 10 MILLIGRAM(S): 10 TABLET, FILM COATED ORAL at 21:46

## 2023-10-07 RX ADMIN — Medication 400 MILLIGRAM(S): at 00:38

## 2023-10-07 RX ADMIN — Medication 12.5 MILLIGRAM(S): at 08:59

## 2023-10-07 RX ADMIN — Medication 400 MILLIGRAM(S): at 08:41

## 2023-10-07 RX ADMIN — Medication 975 MILLIGRAM(S): at 21:46

## 2023-10-07 RX ADMIN — HYDROMORPHONE HYDROCHLORIDE 30 MILLILITER(S): 2 INJECTION INTRAMUSCULAR; INTRAVENOUS; SUBCUTANEOUS at 07:41

## 2023-10-07 RX ADMIN — Medication 5 MILLIGRAM(S): at 16:40

## 2023-10-07 RX ADMIN — MORPHINE SULFATE 30 MILLIGRAM(S): 50 CAPSULE, EXTENDED RELEASE ORAL at 11:17

## 2023-10-07 RX ADMIN — ATORVASTATIN CALCIUM 10 MILLIGRAM(S): 80 TABLET, FILM COATED ORAL at 21:46

## 2023-10-07 RX ADMIN — PANTOPRAZOLE SODIUM 40 MILLIGRAM(S): 20 TABLET, DELAYED RELEASE ORAL at 05:26

## 2023-10-07 RX ADMIN — Medication 50 MILLIGRAM(S): at 05:26

## 2023-10-07 RX ADMIN — Medication 975 MILLIGRAM(S): at 22:16

## 2023-10-07 RX ADMIN — VALSARTAN 40 MILLIGRAM(S): 80 TABLET ORAL at 05:27

## 2023-10-07 RX ADMIN — SODIUM CHLORIDE 1000 MILLILITER(S): 9 INJECTION INTRAMUSCULAR; INTRAVENOUS; SUBCUTANEOUS at 09:18

## 2023-10-07 RX ADMIN — MORPHINE SULFATE 30 MILLIGRAM(S): 50 CAPSULE, EXTENDED RELEASE ORAL at 20:21

## 2023-10-07 RX ADMIN — Medication 1000 MILLIGRAM(S): at 01:08

## 2023-10-07 RX ADMIN — Medication 101.6 MILLIGRAM(S): at 05:27

## 2023-10-07 RX ADMIN — MORPHINE SULFATE 30 MILLIGRAM(S): 50 CAPSULE, EXTENDED RELEASE ORAL at 20:51

## 2023-10-07 RX ADMIN — ONDANSETRON 4 MILLIGRAM(S): 8 TABLET, FILM COATED ORAL at 05:27

## 2023-10-07 NOTE — PROGRESS NOTE ADULT - SUBJECTIVE AND OBJECTIVE BOX
Patient is a 77y old  Female who presents with a chief complaint of Right knee OA/Right TKA (06 Oct 2023 19:51)      POST OPERATIVE DAY #:  [1 ]   Patient comfortable  c/o nausea  on PCA - sts no working    T(C): 37 (10-07-23 @ 05:12), Max: 37.6 (10-07-23 @ 00:32)  HR: 93 (10-07-23 @ 05:12) (69 - 93)  BP: 130/72 (10-07-23 @ 05:12) (118/66 - 158/87)  RR: 18 (10-07-23 @ 05:12) (15 - 18)  SpO2: 99% (10-07-23 @ 05:12) (92% - 100%)  Wt(kg): --    PHYSICAL EXAM:  NAD, Alert  EXT:\  RLe  [x ] ACe Dressing dc'd   [x ] Aquacel Dressing C/D/I  Calves soft  (+) DF/PF;  EHL FHL: 5/5  No gross Sensation deficits noted   (2+) Distal Pulses;   B/L, PAS     LABS:                        9.9<L>  12.67<H> )-----------( 196      ( 07 Oct 2023 07:07 )             29.3<L>    10-07    137  |  100  |  12  ----------------------------<  108<H>  4.2   |  24  |  0.80

## 2023-10-07 NOTE — PROGRESS NOTE ADULT - ASSESSMENT
Assessment: s/p R TKA    Plan:   Anti-emetics  spoke w/ Pain Service      - Will d/c PCA  Chronic Pain Recs GREATLY appreciated      MSO4 IR restarted  ASA 81 BID  ck labs  dispo; home once cleared by PT + pain/nausea controlled      ***See Above  Perry HAMILTON  Orthopedics  B: [phone]  S: [phone]

## 2023-10-07 NOTE — PROGRESS NOTE ADULT - SUBJECTIVE AND OBJECTIVE BOX
Anesthesia Pain Management Service    SUBJECTIVE: Patient is doing well with IV PCA and no significant problems reported.    Pain Scale Score	At rest: ___ 	With Activity: ___ 	[X ] Refer to charted pain scores    THERAPY:    [ ] IV PCA Morphine		[ ] 5 mg/mL	[ ] 1 mg/mL  [X ] IV PCA Hydromorphone	[ ] 5 mg/mL	[X ] 1 mg/mL  [ ] IV PCA Fentanyl		[ ] 50 micrograms/mL    Demand dose __0.2_ lockout __6_ (minutes) Continuous Rate _0__ Total: ___  mg used (in past 24 hours)      MEDICATIONS  (STANDING):  acetaminophen     Tablet .. 975 milliGRAM(s) Oral every 8 hours  aspirin enteric coated 81 milliGRAM(s) Oral two times a day  atorvastatin 10 milliGRAM(s) Oral at bedtime  metoprolol succinate ER 50 milliGRAM(s) Oral daily  pantoprazole    Tablet 40 milliGRAM(s) Oral before breakfast  polyethylene glycol 3350 17 Gram(s) Oral at bedtime  senna 2 Tablet(s) Oral at bedtime  valsartan 40 milliGRAM(s) Oral daily    MEDICATIONS  (PRN):  cyclobenzaprine 10 milliGRAM(s) Oral at bedtime PRN Muscle Spasm  lidocaine   4% Patch 1 Patch Transdermal daily PRN back pain  magnesium hydroxide Suspension 30 milliLiter(s) Oral daily PRN Constipation  morphine  IR 15 milliGRAM(s) Oral every 4 hours PRN Moderate Pain (4 - 6)  morphine  IR 30 milliGRAM(s) Oral every 4 hours PRN Severe Pain (7 - 10)  ondansetron Injectable 4 milliGRAM(s) IV Push every 6 hours PRN Nausea and/or Vomiting      OBJECTIVE:    Sedation Score:	[ X] Alert	[ ] Drowsy 	[ ] Arousable	[ ] Asleep	[ ] Unresponsive    Side Effects:	[X ] None	[ ] Nausea	[ ] Vomiting	[ ] Pruritus  		[ ] Other:    Vital Signs Last 24 Hrs  T(C): 36.9 (07 Oct 2023 09:34), Max: 37.6 (07 Oct 2023 00:32)  T(F): 98.4 (07 Oct 2023 09:34), Max: 99.6 (07 Oct 2023 00:32)  HR: 85 (07 Oct 2023 09:34) (69 - 93)  BP: 115/73 (07 Oct 2023 09:34) (115/73 - 146/66)  BP(mean): 84 (06 Oct 2023 13:00) (84 - 101)  RR: 18 (07 Oct 2023 09:34) (15 - 18)  SpO2: 94% (07 Oct 2023 09:34) (93% - 100%)    Parameters below as of 07 Oct 2023 09:34  Patient On (Oxygen Delivery Method): room air        ASSESSMENT/ PLAN    Therapy to  be:	[ X] Continue   [ ] Discontinued   [ ] Change to prn Analgesics    Documentation and Verification of current medications:   [X] Done	[ ] Not done, not elligible    Progress Note written now but Patient was seen earlier. Anesthesia Pain Management Service    SUBJECTIVE: Patient is doing well with IV PCA and no significant problems reported.    Pain Scale Score	At rest: ___ 	With Activity: ___ 	[X ] Refer to charted pain scores    THERAPY:    [ ] IV PCA Morphine		[ ] 5 mg/mL	[ ] 1 mg/mL  [X ] IV PCA Hydromorphone	[ ] 5 mg/mL	[X ] 1 mg/mL  [ ] IV PCA Fentanyl		[ ] 50 micrograms/mL    Demand dose __0.2_ lockout __6_ (minutes) Continuous Rate _0__ Total: ___   mg used (in past 24 hrs)      MEDICATIONS  (STANDING):  acetaminophen     Tablet .. 975 milliGRAM(s) Oral every 8 hours  aspirin enteric coated 81 milliGRAM(s) Oral two times a day  atorvastatin 10 milliGRAM(s) Oral at bedtime  metoprolol succinate ER 50 milliGRAM(s) Oral daily  pantoprazole    Tablet 40 milliGRAM(s) Oral before breakfast  polyethylene glycol 3350 17 Gram(s) Oral at bedtime  senna 2 Tablet(s) Oral at bedtime  valsartan 40 milliGRAM(s) Oral daily    MEDICATIONS  (PRN):  cyclobenzaprine 10 milliGRAM(s) Oral at bedtime PRN Muscle Spasm  lidocaine   4% Patch 1 Patch Transdermal daily PRN back pain  magnesium hydroxide Suspension 30 milliLiter(s) Oral daily PRN Constipation  morphine  IR 15 milliGRAM(s) Oral every 4 hours PRN Moderate Pain (4 - 6)  morphine  IR 30 milliGRAM(s) Oral every 4 hours PRN Severe Pain (7 - 10)  ondansetron Injectable 4 milliGRAM(s) IV Push every 6 hours PRN Nausea and/or Vomiting      OBJECTIVE:    Sedation Score:	[ X] Alert	[ ] Drowsy 	[ ] Arousable	[ ] Asleep	[ ] Unresponsive    Side Effects:	[X ] None	[ ] Nausea	[ ] Vomiting	[ ] Pruritus  		[ ] Other:    Vital Signs Last 24 Hrs  T(C): 36.9 (07 Oct 2023 09:34), Max: 37.6 (07 Oct 2023 00:32)  T(F): 98.4 (07 Oct 2023 09:34), Max: 99.6 (07 Oct 2023 00:32)  HR: 85 (07 Oct 2023 09:34) (69 - 93)  BP: 115/73 (07 Oct 2023 09:34) (115/73 - 146/66)  BP(mean): 84 (06 Oct 2023 13:00) (84 - 101)  RR: 18 (07 Oct 2023 09:34) (15 - 18)  SpO2: 94% (07 Oct 2023 09:34) (93% - 100%)    Parameters below as of 07 Oct 2023 09:34  Patient On (Oxygen Delivery Method): room air        ASSESSMENT/ PLAN    Therapy to  be:	[ ] Continue   [ X] Discontinued   [X ] Change to prn Analgesics    Documentation and Verification of current medications:   [X] Done	[ ] Not done, not elligible    Comments: PRN Oral/IV opioids and/or Adjuvant non-opioid medication to be ordered at this point.    Progress Note written now but Patient was seen earlier.

## 2023-10-08 ENCOUNTER — TRANSCRIPTION ENCOUNTER (OUTPATIENT)
Age: 78
End: 2023-10-08

## 2023-10-08 VITALS
OXYGEN SATURATION: 94 % | RESPIRATION RATE: 18 BRPM | TEMPERATURE: 98 F | HEART RATE: 86 BPM | SYSTOLIC BLOOD PRESSURE: 119 MMHG | DIASTOLIC BLOOD PRESSURE: 68 MMHG

## 2023-10-08 RX ORDER — PANTOPRAZOLE SODIUM 20 MG/1
1 TABLET, DELAYED RELEASE ORAL
Qty: 40 | Refills: 0
Start: 2023-10-08 | End: 2023-11-16

## 2023-10-08 RX ORDER — ASPIRIN/CALCIUM CARB/MAGNESIUM 324 MG
1 TABLET ORAL
Qty: 0 | Refills: 0 | DISCHARGE
Start: 2023-10-08

## 2023-10-08 RX ORDER — SENNA PLUS 8.6 MG/1
2 TABLET ORAL
Qty: 0 | Refills: 0 | DISCHARGE
Start: 2023-10-08

## 2023-10-08 RX ORDER — POLYETHYLENE GLYCOL 3350 17 G/17G
17 POWDER, FOR SOLUTION ORAL
Qty: 0 | Refills: 0 | DISCHARGE
Start: 2023-10-08

## 2023-10-08 RX ORDER — NALOXONE HYDROCHLORIDE 4 MG/.1ML
1 SPRAY NASAL
Qty: 1 | Refills: 0
Start: 2023-10-08

## 2023-10-08 RX ORDER — METOPROLOL TARTRATE 50 MG
1 TABLET ORAL
Refills: 0 | DISCHARGE

## 2023-10-08 RX ORDER — VALSARTAN 80 MG/1
1 TABLET ORAL
Qty: 0 | Refills: 0 | DISCHARGE
Start: 2023-10-08

## 2023-10-08 RX ORDER — METOPROLOL TARTRATE 50 MG
1 TABLET ORAL
Qty: 0 | Refills: 0 | DISCHARGE
Start: 2023-10-08

## 2023-10-08 RX ORDER — CYCLOBENZAPRINE HYDROCHLORIDE 10 MG/1
10 TABLET, FILM COATED ORAL ONCE
Refills: 0 | Status: COMPLETED | OUTPATIENT
Start: 2023-10-08 | End: 2023-10-08

## 2023-10-08 RX ORDER — ACETAMINOPHEN 500 MG
3 TABLET ORAL
Qty: 0 | Refills: 0 | DISCHARGE
Start: 2023-10-08

## 2023-10-08 RX ORDER — MORPHINE SULFATE 50 MG/1
1 CAPSULE, EXTENDED RELEASE ORAL
Qty: 0 | Refills: 0 | DISCHARGE
Start: 2023-10-08

## 2023-10-08 RX ORDER — ASPIRIN/CALCIUM CARB/MAGNESIUM 324 MG
1 TABLET ORAL
Qty: 80 | Refills: 0
Start: 2023-10-08 | End: 2023-11-16

## 2023-10-08 RX ORDER — PANTOPRAZOLE SODIUM 20 MG/1
1 TABLET, DELAYED RELEASE ORAL
Qty: 0 | Refills: 0 | DISCHARGE
Start: 2023-10-08

## 2023-10-08 RX ADMIN — MORPHINE SULFATE 30 MILLIGRAM(S): 50 CAPSULE, EXTENDED RELEASE ORAL at 07:53

## 2023-10-08 RX ADMIN — VALSARTAN 40 MILLIGRAM(S): 80 TABLET ORAL at 05:10

## 2023-10-08 RX ADMIN — Medication 975 MILLIGRAM(S): at 01:35

## 2023-10-08 RX ADMIN — MORPHINE SULFATE 30 MILLIGRAM(S): 50 CAPSULE, EXTENDED RELEASE ORAL at 03:06

## 2023-10-08 RX ADMIN — MORPHINE SULFATE 30 MILLIGRAM(S): 50 CAPSULE, EXTENDED RELEASE ORAL at 13:50

## 2023-10-08 RX ADMIN — MORPHINE SULFATE 30 MILLIGRAM(S): 50 CAPSULE, EXTENDED RELEASE ORAL at 02:36

## 2023-10-08 RX ADMIN — Medication 975 MILLIGRAM(S): at 05:39

## 2023-10-08 RX ADMIN — Medication 975 MILLIGRAM(S): at 05:09

## 2023-10-08 RX ADMIN — MORPHINE SULFATE 30 MILLIGRAM(S): 50 CAPSULE, EXTENDED RELEASE ORAL at 08:30

## 2023-10-08 RX ADMIN — CYCLOBENZAPRINE HYDROCHLORIDE 10 MILLIGRAM(S): 10 TABLET, FILM COATED ORAL at 11:00

## 2023-10-08 RX ADMIN — Medication 81 MILLIGRAM(S): at 05:09

## 2023-10-08 RX ADMIN — Medication 50 MILLIGRAM(S): at 05:10

## 2023-10-08 RX ADMIN — Medication 975 MILLIGRAM(S): at 13:05

## 2023-10-08 RX ADMIN — MORPHINE SULFATE 30 MILLIGRAM(S): 50 CAPSULE, EXTENDED RELEASE ORAL at 13:06

## 2023-10-08 RX ADMIN — PANTOPRAZOLE SODIUM 40 MILLIGRAM(S): 20 TABLET, DELAYED RELEASE ORAL at 05:10

## 2023-10-08 NOTE — PROGRESS NOTE ADULT - ASSESSMENT
Impression: Stable       Plan:   Continue present treatment                 Out of bed, ambulate, weight bearing as tolerated                 Physical therapy follow up                 Continue to monitor    Elvin Garcia PA-C  Orthopaedic Surgery  Team pager [phone]  [phone]

## 2023-10-08 NOTE — DISCHARGE NOTE NURSING/CASE MANAGEMENT/SOCIAL WORK - PATIENT PORTAL LINK FT
You can access the FollowMyHealth Patient Portal offered by [facility] by registering at the following website: [email or web address]. By joining [employer]’s FollowMyHealth portal, you will also be able to view your health information using other applications (apps) compatible with our system.

## 2023-10-08 NOTE — DISCHARGE NOTE NURSING/CASE MANAGEMENT/SOCIAL WORK - NSDCPEFALRISK_GEN_ALL_CORE
For information on Fall & Injury Prevention, visit: [email or web address] OR  [email or web address] OR  https://www.cdc.gov/steadi/patient.html

## 2023-10-10 PROCEDURE — C1713: CPT

## 2023-10-10 PROCEDURE — 97110 THERAPEUTIC EXERCISES: CPT

## 2023-10-10 PROCEDURE — 27447 TOTAL KNEE ARTHROPLASTY: CPT | Mod: RT

## 2023-10-10 PROCEDURE — 97530 THERAPEUTIC ACTIVITIES: CPT

## 2023-10-10 PROCEDURE — 97161 PT EVAL LOW COMPLEX 20 MIN: CPT

## 2023-10-10 PROCEDURE — 80048 BASIC METABOLIC PNL TOTAL CA: CPT

## 2023-10-10 PROCEDURE — C1776: CPT

## 2023-10-10 PROCEDURE — 85027 COMPLETE CBC AUTOMATED: CPT

## 2023-10-10 PROCEDURE — 73560 X-RAY EXAM OF KNEE 1 OR 2: CPT

## 2023-10-10 PROCEDURE — 97165 OT EVAL LOW COMPLEX 30 MIN: CPT

## 2023-10-10 PROCEDURE — C9399: CPT

## 2023-10-10 PROCEDURE — 97116 GAIT TRAINING THERAPY: CPT

## 2023-10-10 PROCEDURE — 82962 GLUCOSE BLOOD TEST: CPT

## 2023-10-10 RX ORDER — VALSARTAN 80 MG/1
1 TABLET ORAL
Refills: 0 | DISCHARGE

## 2023-12-13 NOTE — H&P PST ADULT - PROBLEM/PLAN-3
Chief Complaint   Patient presents with    Gout     Pt is an acute with Gout as of 12/4 of RLE and is having discomfort. ED workup on file for review. HPI:  Patient is here  complains of right knee pain that started about 10 days ago getting progressively worse. She had worked in the backyard that day and went shopping afterwards and then when she came home she started having some pain by the morning it was really severe so she went to the emergency room. She had a knee x-ray which showed some effusion and tricompartmental osteoarthritis. She had a CTA of the chest to rule out  PE which was ruled out. Uric acid was elevated at 7. Also her sodium was low at 129 and her white count was elevated at 11.1. Patient has been complaining of sinus congestion and drainage around that same time. She denied any coughing fevers or chills. She was found to be tachycardic in the hospital but her pulse ox remained stable. She was COVID tested and was negative. She was started on prednisone which has helped for the first couple of days but then pains are now starting to develop in the left knee and bilateral ankles. On exam she has right knee swelling with effusion and tenderness on the knee joint with limitation of flexion to 90 degrees. Pain is worse climbing down the stairs and trying to stand up from a sitting position. We will check inflammatory markers and repeat sodium level. Will start her on Zithromax. We will also start her on Colcrys 0.6 mg twice a day. Past Medical History, Surgical History, and Family History has been reviewed and updated.     Review of Systems:  Constitutional:  No fever, no fatigue, no chills, no headaches, no weight change  Dermatology:  No rash, no mole, no dry or sensitive skin  ENT:  No cough, no sore throat, no sinus pain, no runny nose, no ear pain  Cardiology:  No chest pain, no palpitations, no leg edema, no shortness of breath, no PND  Gastroenterology:  No dysphagia,
DISPLAY PLAN FREE TEXT

## 2024-06-24 ENCOUNTER — APPOINTMENT (OUTPATIENT)
Dept: MAMMOGRAPHY | Facility: CLINIC | Age: 79
End: 2024-06-24
Payer: MEDICARE

## 2024-06-24 ENCOUNTER — OUTPATIENT (OUTPATIENT)
Dept: OUTPATIENT SERVICES | Facility: HOSPITAL | Age: 79
LOS: 1 days | End: 2024-06-24
Payer: MEDICARE

## 2024-06-24 DIAGNOSIS — Z00.8 ENCOUNTER FOR OTHER GENERAL EXAMINATION: ICD-10-CM

## 2024-06-24 DIAGNOSIS — Z90.89 ACQUIRED ABSENCE OF OTHER ORGANS: Chronic | ICD-10-CM

## 2024-06-24 DIAGNOSIS — Z98.891 HISTORY OF UTERINE SCAR FROM PREVIOUS SURGERY: Chronic | ICD-10-CM

## 2024-06-24 DIAGNOSIS — Z98.1 ARTHRODESIS STATUS: Chronic | ICD-10-CM

## 2024-06-24 DIAGNOSIS — Z87.81 PERSONAL HISTORY OF (HEALED) TRAUMATIC FRACTURE: Chronic | ICD-10-CM

## 2024-06-24 PROCEDURE — 77067 SCR MAMMO BI INCL CAD: CPT | Mod: 26

## 2024-06-24 PROCEDURE — 77063 BREAST TOMOSYNTHESIS BI: CPT | Mod: 26

## 2024-06-24 PROCEDURE — 77063 BREAST TOMOSYNTHESIS BI: CPT

## 2024-06-24 PROCEDURE — 77067 SCR MAMMO BI INCL CAD: CPT

## 2024-12-31 NOTE — ED STATDOCS - OBJECTIVE STATEMENT
74 y/o F pt with hx of chronic back pain, spinal fusions, HTN, and HLD c/o abdominal pain/swelling with associated constipation, nausea, diarrhea since last night. Pt now in ED c/o bloody watery diarrhea starting today. Pt's last BM was yesterday evening; she states her nausea, and constipation are typically normal for her. Her  reports that pt has had more decreased appetite than her usual; but pt states she usually eats in "small amounts". She admits her and her  ate [employer] just last evening and believe the chicken was undercooked, pt's  did not feel any abdominal pain. Pt states her pain was very severe, and she notes tenderness to palpation. Pt has had 2 colonoscopies int eh past, with no issues; pt has one scheduled for her 10 year visit. Pt is currently on Flexeril, and morphine as needed. Pt took Morphine today at 10:00AM. Pt is allergic to Sulfa. Denies vomiting, recent travel, and hx of diverticulitis. No further complaints at this time.
31-Dec-2024 13:50

## 2025-03-18 ENCOUNTER — NON-APPOINTMENT (OUTPATIENT)
Age: 80
End: 2025-03-18

## 2025-03-27 ENCOUNTER — APPOINTMENT (OUTPATIENT)
Dept: DERMATOLOGY | Facility: CLINIC | Age: 80
End: 2025-03-27
Payer: MEDICARE

## 2025-03-27 PROCEDURE — 99203 OFFICE O/P NEW LOW 30 MIN: CPT

## (undated) DEVICE — SOL IRR BAG NS 0.9% 3000ML

## (undated) DEVICE — SUT MONOCRYL 3-0 18" PS-2 UNDYED

## (undated) DEVICE — SAW BLADE STRYKER SAGITTAL 81.5X12.5X1.19MM

## (undated) DEVICE — DRSG TAPE TRANSPORE 3"

## (undated) DEVICE — DRSG DERMABOND 0.7ML

## (undated) DEVICE — SUT POLYSORB 2-0 30" GS-21 UNDYED

## (undated) DEVICE — SUT POLYSORB 0 18" GS-21

## (undated) DEVICE — WOUND IRR SURGIPHOR

## (undated) DEVICE — DRSG AQUACEL 3.5 X 10"

## (undated) DEVICE — DRSG STERISTRIPS 0.5 X 4"

## (undated) DEVICE — GOWN TRIMAX XXL

## (undated) DEVICE — SYR LUER LOK 20CC

## (undated) DEVICE — VENODYNE/SCD SLEEVE CALF LARGE

## (undated) DEVICE — NDL HYPO SAFE 18G X 1.5" (PINK)

## (undated) DEVICE — POSITIONER FOAM EGG CRATE ULNAR 2PCS (PINK)

## (undated) DEVICE — SAW BLADE STRYKER SAGITTAL 25X1.27X90

## (undated) DEVICE — NDL HYPO SAFE 22G X 1.5" (BLACK)

## (undated) DEVICE — DRSG WEBRIL 6"

## (undated) DEVICE — MEDICATION LABELS W MARKER

## (undated) DEVICE — GLV 8.5 PROTEXIS (WHITE)

## (undated) DEVICE — ELCTR BOVIE PENCIL SMOKE EVACUATION

## (undated) DEVICE — GLV 7.5 PROTEXIS (WHITE)

## (undated) DEVICE — DRAPE MAYO STAND 30"

## (undated) DEVICE — WARMING BLANKET UPPER ADULT

## (undated) DEVICE — STRYKER MIXEVAC 3 BONE CEMENT MIXER

## (undated) DEVICE — DRSG ACE BANDAGE 6"

## (undated) DEVICE — SOL IRR POUR NS 0.9% 500ML

## (undated) DEVICE — SYR LUER LOK 10CC

## (undated) DEVICE — SOL IRR POUR H2O 250ML

## (undated) DEVICE — GLV 8 PROTEXIS (WHITE)

## (undated) DEVICE — POSITIONER CARDIAC BUMP

## (undated) DEVICE — TOURNIQUET CUFF 34" DUAL PORT W PLC

## (undated) DEVICE — DRAPE 3/4 SHEET W REINFORCEMENT 56X77"

## (undated) DEVICE — PACK TOTAL KNEE (2 PACKS)

## (undated) DEVICE — SPECIMEN CONTAINER 100ML

## (undated) DEVICE — HOOD FLYTE STRYKER HELMET SHIELD

## (undated) DEVICE — DRAPE 1/2 SHEET 40X57"